# Patient Record
Sex: FEMALE | Race: WHITE | Employment: UNEMPLOYED | URBAN - METROPOLITAN AREA
[De-identification: names, ages, dates, MRNs, and addresses within clinical notes are randomized per-mention and may not be internally consistent; named-entity substitution may affect disease eponyms.]

---

## 2018-04-23 ENCOUNTER — HOSPITAL ENCOUNTER (OUTPATIENT)
Age: 23
Discharge: HOME | End: 2018-04-25
Payer: COMMERCIAL

## 2018-04-23 DIAGNOSIS — N92.1 MENORRHAGIA WITH IRREGULAR CYCLE: ICD-10-CM

## 2018-04-23 LAB
HCT VFR BLD CALC: 39.7 % (ref 34–48)
HEMOGLOBIN: 11.5 G/DL (ref 11.5–15.5)
MCH RBC QN AUTO: 23.6 PG (ref 26–35)
MCHC RBC AUTO-ENTMCNC: 29 % (ref 32–34.5)
MCV RBC AUTO: 81.5 FL (ref 80–99.9)
PDW BLD-RTO: ABNORMAL FL (ref 11.5–15)
PLATELET # BLD: 324 E9/L (ref 130–450)
PMV BLD AUTO: 10.7 FL (ref 7–12)
RBC # BLD: 4.87 E12/L (ref 3.5–5.5)
WBC # BLD: 6.1 E9/L (ref 4.5–11.5)

## 2018-04-23 PROCEDURE — 85027 COMPLETE CBC AUTOMATED: CPT

## 2018-05-08 ENCOUNTER — HOSPITAL ENCOUNTER (OUTPATIENT)
Age: 23
Discharge: HOME | End: 2018-05-10

## 2018-05-08 PROCEDURE — 88305 TISSUE EXAM BY PATHOLOGIST: CPT

## 2018-10-11 ENCOUNTER — OFFICE VISIT (OUTPATIENT)
Dept: ENDOCRINOLOGY | Age: 23
End: 2018-10-11
Payer: COMMERCIAL

## 2018-10-11 VITALS
SYSTOLIC BLOOD PRESSURE: 108 MMHG | HEIGHT: 63 IN | OXYGEN SATURATION: 98 % | DIASTOLIC BLOOD PRESSURE: 68 MMHG | BODY MASS INDEX: 24.66 KG/M2 | WEIGHT: 139.2 LBS | HEART RATE: 66 BPM

## 2018-10-11 DIAGNOSIS — E04.2 MULTINODULAR GOITER: Primary | ICD-10-CM

## 2018-10-11 DIAGNOSIS — E06.3 HASHIMOTO'S THYROIDITIS: ICD-10-CM

## 2018-10-11 PROCEDURE — 99204 OFFICE O/P NEW MOD 45 MIN: CPT | Performed by: INTERNAL MEDICINE

## 2018-10-11 NOTE — PROGRESS NOTES
1. 2   TSH 0.631   DHEAS (DHEA Sulfate) 304   Prolactin 16.44   17-OH PROGESTERONE 80.90       PAST MEDICAL HISTORY   Past Medical History:   Diagnosis Date    Depression     no medications    GERD (gastroesophageal reflux disease)     Hiatal hernia     PONV (postoperative nausea and vomiting)     Ulcer      PAST SURGICAL HISTORY   Past Surgical History:   Procedure Laterality Date     SECTION  2012    ENDOSCOPY, COLON, DIAGNOSTIC  2015    HYSTEROSCOPY  2018    D&C, polypectomy    INCISE AND DRAIN ABCESS  7/3/2012         UPPER GASTROINTESTINAL ENDOSCOPY  2015     SOCIAL HISTORY   Social History     Social History    Marital status:      Spouse name: N/A    Number of children: N/A    Years of education: N/A     Occupational History    Not on file. Social History Main Topics    Smoking status: Never Smoker    Smokeless tobacco: Never Used    Alcohol use No    Drug use: No    Sexual activity: Yes     Partners: Male     Birth control/ protection: Inserts      Comment: Nuva Ring     Other Topics Concern    Not on file     Social History Narrative    No narrative on file     FAMILY HISTORY   Family History   Problem Relation Age of Onset    Heart Attack Father 45        LAD    Diabetes Maternal Grandmother     Hypertension Maternal Grandfather      ALLERGIES AND DRUG REACTIONS   Allergies   Allergen Reactions    Iv Dye [Iodides] Other (See Comments)     Shut down kidneys, required hospitalization.     Medrol [Methylprednisolone]      IV caused tingling to arms legs face    Percocet [Oxycodone-Acetaminophen] Nausea Only and Other (See Comments)     Jaundice, fever, chills and hallucinations       CURRENT MEDICATIONS     Current Outpatient Prescriptions   Medication Sig Dispense Refill    etonogestrel-ethinyl estradiol (NUVARING) 0.12-0.015 MG/24HR vaginal ring Place 1 each vaginally every 21 days Insert one (1) ring vaginally and leave in place for three (3) weeks, then remove for one (1) week. 3 each 1    ferrous sulfate 325 (65 Fe) MG tablet Take 1 tablet by mouth 2 times daily (with meals) 60 tablet 3     No current facility-administered medications for this visit. Review of Systems  Constitutional: No fever, no chills, no diaphoresis, no generalized weakness. HEENT: No blurred vision, No sore throat, no ear pain, no hair loss  Neck: denied any neck swelling, difficulty swallowing,   Cadrdiopulomary: No CP, SOB or palpitation, No orthopnea or PND. No cough or wheezing. GI: No N/V/D, no constipation, No abdominal pain, no melena or hematochezia   : Denied any dysuria, hematuria, flank pain, discharge, or incontinence. Skin: denied any rash, ulcer, Hirsute, or hyperpigmentation. MSK: denied any joint deformity, joint pain/swelling, muscle pain, or back pain. Neuro: no numbess, no tingling, no weakness,     OBJECTIVE    /68 (Site: Left Upper Arm, Position: Sitting, Cuff Size: Medium Adult)   Pulse 66   Ht 5' 3\" (1.6 m)   Wt 139 lb 3.2 oz (63.1 kg)   SpO2 98%   BMI 24.66 kg/m²   BP Readings from Last 4 Encounters:   10/11/18 108/68   09/10/18 118/80   05/21/18 100/68   05/04/18 112/64     Wt Readings from Last 6 Encounters:   10/11/18 139 lb 3.2 oz (63.1 kg)   09/10/18 140 lb 6.4 oz (63.7 kg)   05/21/18 137 lb 3.2 oz (62.2 kg)   05/04/18 140 lb 3.2 oz (63.6 kg)   03/19/18 140 lb (63.5 kg)   03/07/18 140 lb (63.5 kg)       Physical examination:  General: awake alert, oriented x3, no abnormal position or movements. HEENT: normocephalic non traumatic  Neck: supple, no LN enlargement, no thyromegaly, no thyroid tenderness, no JVD. Pulm: Clear equal air entry no added sounds, no wheezing or rhonchi    CVS: S1 + S2, no murmur, no heave. Dorsalis pedis pulse palpable   Abd: soft lax, no tenderness, no organomegaly, audible bowel sounds. Skin: warm, no lesions, no rash.  No Palmar erythema    Neuro: CN intact, sensation normal , muscle power normal. No tremors   Psych: normal mood, and affect    Review of Laboratory Data:  I have reviewed the following:  Lab Results   Component Value Date/Time    WBC 6.1 04/23/2018 12:00 PM    RBC 4.87 04/23/2018 12:00 PM    HGB 11.5 04/23/2018 12:00 PM    HCT 39.7 04/23/2018 12:00 PM    MCV 81.5 04/23/2018 12:00 PM    MCH 23.6 (L) 04/23/2018 12:00 PM    MCHC 29.0 (L) 04/23/2018 12:00 PM    RDW NOT CALC 04/23/2018 12:00 PM     04/23/2018 12:00 PM    MPV 10.7 04/23/2018 12:00 PM      Lab Results   Component Value Date/Time     03/07/2018 03:46 PM    K 3.8 03/07/2018 03:46 PM    CO2 25 03/07/2018 03:46 PM    BUN 8 03/07/2018 03:46 PM    CALCIUM 9.6 03/07/2018 03:46 PM      Lab Results   Component Value Date/Time    TSH 0.631 03/02/2018 02:10 PM    T4FREE 1.29 03/02/2018 02:10 PM    FT3 3.4 03/02/2018 02:10 PM     No results found for: PTH  No results found for: VITD25    All labs medical records and images were reviewed independently   No procedure found. No procedure found. No procedure found. ASSESSMENT & RECOMMENDATIONS   Theresa Perez, a 20 y.o.-old female seen in for the following issues     Multinodular goiter   · Thyroid gland was mildly enlarged (Rt lobe measures 4.3 x 1.8 x 1.9 cm, Lt lobe measures 3.9 x 2 x 1.6 cm) and thyroid nodules are very tiny  · Prevalence of thyroid nodule on thyroid ultrasound is 50% and 95 % of these nodules are benign. · Given nodule size and lack of ultrasound suspicious features which making the nodule less likely to be malignant, I will continue following with periodic US  · I will repeat thyroid US in 5/2018   · Will also check Thyroid Abs in few days to r/o Hashimoto's thyroiditis especially with heterogenous appearance of thyroid gland on US     Follow up  · 5/2018     The above issues were reviewed with the patient who understood and agreed with the plan. 30 minutes were spent today in management of this patient.  More than 50% of time spent on counseling of patient on above diagnosis. Thank you for allowing us to participate in the care of this patient. Please do not hesitate to contact us with any additional questions. Diagnosis Orders   1. Multinodular goiter  TSH without Reflex    T4, Free    US Thyroid   2.  Hashimoto's thyroiditis  Thyroid AB      Enzo Osorio MD  Endocrinologist, [facility])   [facility] [address] [facility] [address]   Phone: [phone]  Fax: [phone]

## 2018-10-11 NOTE — LETTER
No current facility-administered medications for this visit. Review of Systems  Constitutional: No fever, no chills, no diaphoresis, no generalized weakness. HEENT: No blurred vision, No sore throat, no ear pain, no hair loss  Neck: denied any neck swelling, difficulty swallowing,   Cadrdiopulomary: No CP, SOB or palpitation, No orthopnea or PND. No cough or wheezing. GI: No N/V/D, no constipation, No abdominal pain, no melena or hematochezia   : Denied any dysuria, hematuria, flank pain, discharge, or incontinence. Skin: denied any rash, ulcer, Hirsute, or hyperpigmentation. MSK: denied any joint deformity, joint pain/swelling, muscle pain, or back pain. Neuro: no numbess, no tingling, no weakness,     OBJECTIVE    /68 (Site: Left Upper Arm, Position: Sitting, Cuff Size: Medium Adult)   Pulse 66   Ht 5' 3\" (1.6 m)   Wt 139 lb 3.2 oz (63.1 kg)   SpO2 98%   BMI 24.66 kg/m²    BP Readings from Last 4 Encounters:   10/11/18 108/68   09/10/18 118/80   05/21/18 100/68   05/04/18 112/64     Wt Readings from Last 6 Encounters:   10/11/18 139 lb 3.2 oz (63.1 kg)   09/10/18 140 lb 6.4 oz (63.7 kg)   05/21/18 137 lb 3.2 oz (62.2 kg)   05/04/18 140 lb 3.2 oz (63.6 kg)   03/19/18 140 lb (63.5 kg)   03/07/18 140 lb (63.5 kg)       Physical examination:  General: awake alert, oriented x3, no abnormal position or movements. HEENT: normocephalic non traumatic  Neck: supple, no LN enlargement, no thyromegaly, no thyroid tenderness, no JVD. Pulm: Clear equal air entry no added sounds, no wheezing or rhonchi    CVS: S1 + S2, no murmur, no heave. Dorsalis pedis pulse palpable   Abd: soft lax, no tenderness, no organomegaly, audible bowel sounds. Skin: warm, no lesions, no rash.  No Palmar erythema    Neuro: CN intact, sensation normal , muscle power normal. No tremors   Psych: normal mood, and affect    Review of Laboratory Data:  I have reviewed the following:  Lab Results   Component Value Date/Time WBC 6.1 04/23/2018 12:00 PM    RBC 4.87 04/23/2018 12:00 PM    HGB 11.5 04/23/2018 12:00 PM    HCT 39.7 04/23/2018 12:00 PM    MCV 81.5 04/23/2018 12:00 PM    MCH 23.6 (L) 04/23/2018 12:00 PM    MCHC 29.0 (L) 04/23/2018 12:00 PM    RDW NOT CALC 04/23/2018 12:00 PM     04/23/2018 12:00 PM    MPV 10.7 04/23/2018 12:00 PM      Lab Results   Component Value Date/Time     03/07/2018 03:46 PM    K 3.8 03/07/2018 03:46 PM    CO2 25 03/07/2018 03:46 PM    BUN 8 03/07/2018 03:46 PM    CALCIUM 9.6 03/07/2018 03:46 PM      Lab Results   Component Value Date/Time    TSH 0.631 03/02/2018 02:10 PM    T4FREE 1.29 03/02/2018 02:10 PM    FT3 3.4 03/02/2018 02:10 PM     No results found for: PTH  No results found for: VITD25    All labs medical records and images were reviewed independently   No procedure found. No procedure found. No procedure found. ASSESSMENT & RECOMMENDATIONS   Theresa Perez, a 20 y.o.-old female seen in for the following issues     Multinodular goiter   · Prevalence of thyroid nodule on thyroid ultrasound is 50% and 95 % of these nodules are benign. · Given nodule size and lack of ultrasound suspicious features which making the nodule less likely to be malignant, I will continue following with periodic US      Follow up    The above issues were reviewed with the patient who understood and agreed with the plan. 30 minutes were spent today in management of this patient. More than 50% of time spent on counseling of patient on above diagnosis. Thank you for allowing us to participate in the care of this patient. Please do not hesitate to contact us with any additional questions.      Lefty Villar MD  Endocrinologist, [facility])   [facility] [address] [facility] [address]   Phone: [phone]  Fax: [phone]

## 2019-01-02 ENCOUNTER — TELEPHONE (OUTPATIENT)
Dept: FAMILY MEDICINE CLINIC | Age: 24
End: 2019-01-02

## 2019-01-04 ENCOUNTER — OFFICE VISIT (OUTPATIENT)
Dept: FAMILY MEDICINE CLINIC | Age: 24
End: 2019-01-04
Payer: COMMERCIAL

## 2019-01-04 VITALS
SYSTOLIC BLOOD PRESSURE: 118 MMHG | HEART RATE: 98 BPM | BODY MASS INDEX: 23.03 KG/M2 | DIASTOLIC BLOOD PRESSURE: 68 MMHG | TEMPERATURE: 98 F | WEIGHT: 130 LBS | OXYGEN SATURATION: 99 %

## 2019-01-04 DIAGNOSIS — F43.23 ADJUSTMENT DISORDER WITH MIXED ANXIETY AND DEPRESSED MOOD: Primary | ICD-10-CM

## 2019-01-04 DIAGNOSIS — Z13.31 POSITIVE DEPRESSION SCREENING: ICD-10-CM

## 2019-01-04 PROCEDURE — 96160 PT-FOCUSED HLTH RISK ASSMT: CPT | Performed by: FAMILY MEDICINE

## 2019-01-04 PROCEDURE — G8484 FLU IMMUNIZE NO ADMIN: HCPCS | Performed by: FAMILY MEDICINE

## 2019-01-04 PROCEDURE — G8420 CALC BMI NORM PARAMETERS: HCPCS | Performed by: FAMILY MEDICINE

## 2019-01-04 PROCEDURE — G8431 POS CLIN DEPRES SCRN F/U DOC: HCPCS | Performed by: FAMILY MEDICINE

## 2019-01-04 PROCEDURE — G8427 DOCREV CUR MEDS BY ELIG CLIN: HCPCS | Performed by: FAMILY MEDICINE

## 2019-01-04 PROCEDURE — 99214 OFFICE O/P EST MOD 30 MIN: CPT | Performed by: FAMILY MEDICINE

## 2019-01-04 PROCEDURE — 1036F TOBACCO NON-USER: CPT | Performed by: FAMILY MEDICINE

## 2019-01-04 RX ORDER — CITALOPRAM 20 MG/1
20 TABLET ORAL DAILY
Qty: 30 TABLET | Refills: 1 | Status: SHIPPED | OUTPATIENT
Start: 2019-01-04 | End: 2019-02-04 | Stop reason: SDUPTHER

## 2019-01-04 ASSESSMENT — PATIENT HEALTH QUESTIONNAIRE - PHQ9
9. THOUGHTS THAT YOU WOULD BE BETTER OFF DEAD, OR OF HURTING YOURSELF: 0
3. TROUBLE FALLING OR STAYING ASLEEP: 3
6. FEELING BAD ABOUT YOURSELF - OR THAT YOU ARE A FAILURE OR HAVE LET YOURSELF OR YOUR FAMILY DOWN: 3
5. POOR APPETITE OR OVEREATING: 3
2. FEELING DOWN, DEPRESSED OR HOPELESS: 3
4. FEELING TIRED OR HAVING LITTLE ENERGY: 3
SUM OF ALL RESPONSES TO PHQ QUESTIONS 1-9: 21
SUM OF ALL RESPONSES TO PHQ9 QUESTIONS 1 & 2: 6
8. MOVING OR SPEAKING SO SLOWLY THAT OTHER PEOPLE COULD HAVE NOTICED. OR THE OPPOSITE, BEING SO FIGETY OR RESTLESS THAT YOU HAVE BEEN MOVING AROUND A LOT MORE THAN USUAL: 0
7. TROUBLE CONCENTRATING ON THINGS, SUCH AS READING THE NEWSPAPER OR WATCHING TELEVISION: 3
1. LITTLE INTEREST OR PLEASURE IN DOING THINGS: 3
SUM OF ALL RESPONSES TO PHQ QUESTIONS 1-9: 21
10. IF YOU CHECKED OFF ANY PROBLEMS, HOW DIFFICULT HAVE THESE PROBLEMS MADE IT FOR YOU TO DO YOUR WORK, TAKE CARE OF THINGS AT HOME, OR GET ALONG WITH OTHER PEOPLE: 3

## 2019-02-04 ENCOUNTER — OFFICE VISIT (OUTPATIENT)
Dept: FAMILY MEDICINE CLINIC | Age: 24
End: 2019-02-04
Payer: COMMERCIAL

## 2019-02-04 VITALS
DIASTOLIC BLOOD PRESSURE: 70 MMHG | TEMPERATURE: 98.9 F | BODY MASS INDEX: 23.03 KG/M2 | WEIGHT: 130 LBS | OXYGEN SATURATION: 98 % | HEART RATE: 98 BPM | SYSTOLIC BLOOD PRESSURE: 120 MMHG

## 2019-02-04 DIAGNOSIS — F43.23 ADJUSTMENT DISORDER WITH MIXED ANXIETY AND DEPRESSED MOOD: Primary | ICD-10-CM

## 2019-02-04 DIAGNOSIS — E04.2 MULTINODULAR THYROID: ICD-10-CM

## 2019-02-04 PROCEDURE — G8427 DOCREV CUR MEDS BY ELIG CLIN: HCPCS | Performed by: FAMILY MEDICINE

## 2019-02-04 PROCEDURE — 1036F TOBACCO NON-USER: CPT | Performed by: FAMILY MEDICINE

## 2019-02-04 PROCEDURE — G8484 FLU IMMUNIZE NO ADMIN: HCPCS | Performed by: FAMILY MEDICINE

## 2019-02-04 PROCEDURE — G8420 CALC BMI NORM PARAMETERS: HCPCS | Performed by: FAMILY MEDICINE

## 2019-02-04 PROCEDURE — 99213 OFFICE O/P EST LOW 20 MIN: CPT | Performed by: FAMILY MEDICINE

## 2019-02-04 RX ORDER — CITALOPRAM 20 MG/1
20 TABLET ORAL DAILY
Qty: 30 TABLET | Refills: 5 | Status: SHIPPED | OUTPATIENT
Start: 2019-02-04 | End: 2019-09-13 | Stop reason: SDUPTHER

## 2019-06-13 ENCOUNTER — OFFICE VISIT (OUTPATIENT)
Dept: FAMILY MEDICINE CLINIC | Age: 24
End: 2019-06-13
Payer: COMMERCIAL

## 2019-06-13 ENCOUNTER — HOSPITAL ENCOUNTER (OUTPATIENT)
Age: 24
Discharge: HOME | End: 2019-06-15
Payer: COMMERCIAL

## 2019-06-13 VITALS
OXYGEN SATURATION: 99 % | HEART RATE: 111 BPM | HEIGHT: 62 IN | SYSTOLIC BLOOD PRESSURE: 110 MMHG | TEMPERATURE: 98.7 F | WEIGHT: 125 LBS | DIASTOLIC BLOOD PRESSURE: 68 MMHG | BODY MASS INDEX: 23 KG/M2

## 2019-06-13 DIAGNOSIS — F41.9 ANXIETY: ICD-10-CM

## 2019-06-13 DIAGNOSIS — D50.9 IRON DEFICIENCY ANEMIA, UNSPECIFIED IRON DEFICIENCY ANEMIA TYPE: ICD-10-CM

## 2019-06-13 DIAGNOSIS — L70.0 ACNE VULGARIS: ICD-10-CM

## 2019-06-13 DIAGNOSIS — F41.9 ANXIETY: Primary | ICD-10-CM

## 2019-06-13 LAB
ALBUMIN SERPL-MCNC: 4.6 G/DL (ref 3.5–5.2)
ALP BLD-CCNC: 46 U/L (ref 35–104)
ALT SERPL-CCNC: 12 U/L (ref 0–32)
ANION GAP SERPL CALCULATED.3IONS-SCNC: 16 MMOL/L (ref 7–16)
AST SERPL-CCNC: 19 U/L (ref 0–31)
BASOPHILS ABSOLUTE: 0.02 E9/L (ref 0–0.2)
BASOPHILS RELATIVE PERCENT: 0.4 % (ref 0–2)
BILIRUB SERPL-MCNC: 0.8 MG/DL (ref 0–1.2)
BUN BLDV-MCNC: 8 MG/DL (ref 6–20)
CALCIUM SERPL-MCNC: 9.8 MG/DL (ref 8.6–10.2)
CHLORIDE BLD-SCNC: 104 MMOL/L (ref 98–107)
CO2: 22 MMOL/L (ref 22–29)
CREAT SERPL-MCNC: 0.7 MG/DL (ref 0.5–1)
EOSINOPHILS ABSOLUTE: 0.26 E9/L (ref 0.05–0.5)
EOSINOPHILS RELATIVE PERCENT: 4.6 % (ref 0–6)
FERRITIN: 15 NG/ML
GFR AFRICAN AMERICAN: >60
GFR NON-AFRICAN AMERICAN: >60 ML/MIN/1.73
GLUCOSE BLD-MCNC: 82 MG/DL (ref 74–99)
HCT VFR BLD CALC: 40.9 % (ref 34–48)
HEMOGLOBIN: 13.1 G/DL (ref 11.5–15.5)
IMMATURE GRANULOCYTES #: 0.03 E9/L
IMMATURE GRANULOCYTES %: 0.5 % (ref 0–5)
IRON SATURATION: 12 % (ref 15–50)
IRON: 56 MCG/DL (ref 37–145)
LYMPHOCYTES ABSOLUTE: 0.74 E9/L (ref 1.5–4)
LYMPHOCYTES RELATIVE PERCENT: 13.1 % (ref 20–42)
MCH RBC QN AUTO: 30 PG (ref 26–35)
MCHC RBC AUTO-ENTMCNC: 32 % (ref 32–34.5)
MCV RBC AUTO: 93.6 FL (ref 80–99.9)
MONOCYTES ABSOLUTE: 0.49 E9/L (ref 0.1–0.95)
MONOCYTES RELATIVE PERCENT: 8.7 % (ref 2–12)
NEUTROPHILS ABSOLUTE: 4.09 E9/L (ref 1.8–7.3)
NEUTROPHILS RELATIVE PERCENT: 72.7 % (ref 43–80)
PDW BLD-RTO: 14.9 FL (ref 11.5–15)
PLATELET # BLD: 255 E9/L (ref 130–450)
PMV BLD AUTO: 11.3 FL (ref 7–12)
POTASSIUM SERPL-SCNC: 4.4 MMOL/L (ref 3.5–5)
RBC # BLD: 4.37 E12/L (ref 3.5–5.5)
SODIUM BLD-SCNC: 142 MMOL/L (ref 132–146)
TOTAL IRON BINDING CAPACITY: 456 MCG/DL (ref 250–450)
TOTAL PROTEIN: 7.8 G/DL (ref 6.4–8.3)
TSH SERPL DL<=0.05 MIU/L-ACNC: 0.8 UIU/ML (ref 0.27–4.2)
WBC # BLD: 5.6 E9/L (ref 4.5–11.5)

## 2019-06-13 PROCEDURE — G8427 DOCREV CUR MEDS BY ELIG CLIN: HCPCS | Performed by: FAMILY MEDICINE

## 2019-06-13 PROCEDURE — 83540 ASSAY OF IRON: CPT

## 2019-06-13 PROCEDURE — 1036F TOBACCO NON-USER: CPT | Performed by: FAMILY MEDICINE

## 2019-06-13 PROCEDURE — 85025 COMPLETE CBC W/AUTO DIFF WBC: CPT

## 2019-06-13 PROCEDURE — 83550 IRON BINDING TEST: CPT

## 2019-06-13 PROCEDURE — 99214 OFFICE O/P EST MOD 30 MIN: CPT | Performed by: FAMILY MEDICINE

## 2019-06-13 PROCEDURE — 80053 COMPREHEN METABOLIC PANEL: CPT

## 2019-06-13 PROCEDURE — 82728 ASSAY OF FERRITIN: CPT

## 2019-06-13 PROCEDURE — 36415 COLL VENOUS BLD VENIPUNCTURE: CPT | Performed by: FAMILY MEDICINE

## 2019-06-13 PROCEDURE — G8420 CALC BMI NORM PARAMETERS: HCPCS | Performed by: FAMILY MEDICINE

## 2019-06-13 PROCEDURE — 84443 ASSAY THYROID STIM HORMONE: CPT

## 2019-06-13 RX ORDER — HYDROXYZINE PAMOATE 25 MG/1
25 CAPSULE ORAL NIGHTLY PRN
Qty: 30 CAPSULE | Refills: 1 | Status: SHIPPED | OUTPATIENT
Start: 2019-06-13 | End: 2019-08-15 | Stop reason: SDUPTHER

## 2019-06-13 RX ORDER — CLINDAMYCIN PHOSPHATE 10 MG/ML
SOLUTION TOPICAL
Qty: 60 EACH | Refills: 2 | Status: SHIPPED | OUTPATIENT
Start: 2019-06-13 | End: 2019-09-08 | Stop reason: SDUPTHER

## 2019-06-13 NOTE — PROGRESS NOTES
[facility]  Office Progress Note - Dr. Lazara Anderson  19    CC:   Chief Complaint   Patient presents with    Depression        S: depression  Improved but having increased anxiety and panic attacks. Sweaty hands, feeling like going to throw up, cant breathe, cant talk, shaking. Often times random, will sometimes wake with them. Sometimes going in public will bring them on   [address] in [facility] -therapist. Niles Scott since 2018. Panic attacks have been going on for a few months. Dec caffeine consumption. Some trouble sleeping  -wakes through tht enight every couple hours. Has tried yoga. freq of attacks is a few times a week or so. Appetite has bene decreased. She notes an acne history on her face and is requesting medication for that. She uses over-the-counter products without significant relief. Duration has been months to years. Recent flareup. She notes an iron deficiency anemia history. She is to be quite anemic. Could not tolerate iron tablets as they upset her stomach, even with every other day dosing. No melena. She does try to eat increased iron in her diet. She has been somewhat fatigued occasionally.     Past Medical History:   Diagnosis Date    Depression     no medications    GERD (gastroesophageal reflux disease)     Hiatal hernia     PONV (postoperative nausea and vomiting)     Ulcer        Family History   Problem Relation Age of Onset    Heart Attack Father 45        LAD    Diabetes Maternal Grandmother     Hypertension Maternal Grandfather        Past Surgical History:   Procedure Laterality Date     SECTION  2012    ENDOSCOPY, COLON, DIAGNOSTIC  2015    HYSTEROSCOPY  2018    D&C, polypectomy    INCISE AND DRAIN ABCESS  7/3/2012         UPPER GASTROINTESTINAL ENDOSCOPY  2015       Social History     Tobacco Use    Smoking status: Never Smoker    Smokeless tobacco: Never Used No SI or HI. Current Outpatient Medications on File Prior to Visit   Medication Sig Dispense Refill    citalopram (CELEXA) 20 MG tablet Take 1 tablet by mouth daily 30 tablet 5    etonogestrel-ethinyl estradiol (NUVARING) 0.12-0.015 MG/24HR vaginal ring Place 1 each vaginally every 21 days Insert one (1) ring vaginally and leave in place for three (3) weeks, then remove for one (1) week. 3 each 1    ferrous sulfate 325 (65 Fe) MG tablet Take 1 tablet by mouth 2 times daily (with meals) 60 tablet 3     No current facility-administered medications on file prior to visit. Patient Active Problem List   Diagnosis Code    GBS (group B Streptococcus carrier), +RV culture, currently pregnant O99.820    Maternal varicella, non-immune O09.899, Z28.3    Wound infection following  section, postpartum O86.00    Multinodular goiter E04.2        Assessment / Yulia Wong was seen today for depression. Diagnoses and all orders for this visit:    Anxiety, increased recently  -     CBC Auto Differential; Future  -     Comprehensive Metabolic Panel; Future  -     TSH without Reflex; Future  -Start    hydrOXYzine (VISTARIL) 25 MG capsule; Take 1 capsule by mouth nightly as needed for Itching  Depression significantly improved with citalopram.  Anxiety and panic have crept up on her. No specific reason identified. We will try some as needed hydroxyzine to see if it helps with sleep and panic. If she is not improving with that, I would like to try increasing her citalopram.    Acne vulgaris  -Start   cLINDAMYCIN PHOSPHATE,TOPICAL, 1 % SWAB; Swab face once to twice daily after gentle soap and rinse. Iron deficiency anemia, unspecified iron deficiency anemia type  -     Ferritin; Future  -     Iron and TIBC; Future  Check labs. Labs reviewed and show improvement from historical levels. She is no longer anemic. Her iron stores are on the low side. Her TIBC is increased.     Return in about 6 weeks (around 7/25/2019). Patient counseled to follow up sooner or seek more acute care if symptoms worsening. Electronically signed by Deb Montague MD on 6/16/2019    This note may have been created using dictation software.  Efforts were made to reduce grammatical or syntax errors, but some may persist.

## 2019-07-02 ENCOUNTER — OFFICE VISIT (OUTPATIENT)
Dept: FAMILY MEDICINE CLINIC | Age: 24
End: 2019-07-02
Payer: COMMERCIAL

## 2019-07-02 VITALS
DIASTOLIC BLOOD PRESSURE: 62 MMHG | HEIGHT: 62 IN | HEART RATE: 84 BPM | BODY MASS INDEX: 23.55 KG/M2 | SYSTOLIC BLOOD PRESSURE: 100 MMHG | TEMPERATURE: 98.2 F | WEIGHT: 128 LBS | OXYGEN SATURATION: 98 %

## 2019-07-02 DIAGNOSIS — N64.4 BREAST PAIN, RIGHT: Primary | ICD-10-CM

## 2019-07-02 PROCEDURE — G8420 CALC BMI NORM PARAMETERS: HCPCS | Performed by: FAMILY MEDICINE

## 2019-07-02 PROCEDURE — 1036F TOBACCO NON-USER: CPT | Performed by: FAMILY MEDICINE

## 2019-07-02 PROCEDURE — G8427 DOCREV CUR MEDS BY ELIG CLIN: HCPCS | Performed by: FAMILY MEDICINE

## 2019-07-02 PROCEDURE — 99213 OFFICE O/P EST LOW 20 MIN: CPT | Performed by: FAMILY MEDICINE

## 2019-07-02 NOTE — PROGRESS NOTES
[facility]  Office Progress Note - Dr. Jarrod Bradley  17    CC:   Chief Complaint   Patient presents with    Breast Mass     right breast lump/painful x6mths        S: Right breast or rib pain  About 6 months. Pressure on the area makes worse. No specific injury. Alleviating factors include none. No breast changes - skin, nipple, discharge etc.    Fam Hx of breast cancer in mother's aunt  Doesn't vary with cycle. Is present every day. Lifts things often. On chaperoned exam with medical assistant Asaf General  Right breast without visual abnormality. Some heterogeneous breast tissue without one specific discrete mass. Tenderness reproducible with palpation. No rib abnormalities underlying. Past Medical History:   Diagnosis Date    Depression     no medications    GERD (gastroesophageal reflux disease)     Hiatal hernia     PONV (postoperative nausea and vomiting)     Ulcer        Family History   Problem Relation Age of Onset    Heart Attack Father 45        LAD    Diabetes Maternal Grandmother     Hypertension Maternal Grandfather        Past Surgical History:   Procedure Laterality Date     SECTION  2012    ENDOSCOPY, COLON, DIAGNOSTIC  2015    HYSTEROSCOPY  2018    D&C, polypectomy    INCISE AND DRAIN ABCESS  7/3/2012         UPPER GASTROINTESTINAL ENDOSCOPY  2015       Social History     Tobacco Use    Smoking status: Never Smoker    Smokeless tobacco: Never Used   Substance Use Topics    Alcohol use: No     Alcohol/week: 0.0 oz    Drug use: No       ROS:  No CP, No palpitations,   No sob, No cough,   No abd pain, No heartburn,   No headaches,   No tingling, No numbness, No weakness,   No bowel changes, No hematochezia, No melena,  ROS otherwise negative unless as listed in HPI. Chart reviewed and updated where appropriate for PMH, Fam, and Soc Hx.     Physical Exam   /62 (Site: Right Upper Arm, Position: XR RIBS RIGHT INCLUDE CHEST (MIN 3 VIEWS); Future    6 months of right breast pain, noncyclical, persistent. Suspect rib pain, versus chest wall pain, versus true breast pain. Recommend x-ray ribs and ultrasound breast to further evaluate the area radiographically. Return if symptoms worsen or fail to improve. Patient counseled to follow up sooner or seek more acute care if symptoms worsening. Electronically signed by Abisai Hurtado MD on 7/3/2019    This note may have been created using dictation software.  Efforts were made to reduce grammatical or syntax errors, but some may persist.

## 2019-07-29 ENCOUNTER — OFFICE VISIT (OUTPATIENT)
Dept: FAMILY MEDICINE CLINIC | Age: 24
End: 2019-07-29
Payer: COMMERCIAL

## 2019-07-29 VITALS
SYSTOLIC BLOOD PRESSURE: 120 MMHG | OXYGEN SATURATION: 99 % | DIASTOLIC BLOOD PRESSURE: 70 MMHG | WEIGHT: 128 LBS | HEART RATE: 70 BPM | TEMPERATURE: 98.3 F | BODY MASS INDEX: 23.41 KG/M2

## 2019-07-29 DIAGNOSIS — F41.9 ANXIETY: ICD-10-CM

## 2019-07-29 DIAGNOSIS — H66.90 ACUTE OTITIS MEDIA, UNSPECIFIED OTITIS MEDIA TYPE: ICD-10-CM

## 2019-07-29 DIAGNOSIS — L40.9 PSORIASIS: Primary | ICD-10-CM

## 2019-07-29 DIAGNOSIS — R07.89 CHEST WALL PAIN: ICD-10-CM

## 2019-07-29 PROCEDURE — 1036F TOBACCO NON-USER: CPT | Performed by: FAMILY MEDICINE

## 2019-07-29 PROCEDURE — G8427 DOCREV CUR MEDS BY ELIG CLIN: HCPCS | Performed by: FAMILY MEDICINE

## 2019-07-29 PROCEDURE — 99214 OFFICE O/P EST MOD 30 MIN: CPT | Performed by: FAMILY MEDICINE

## 2019-07-29 PROCEDURE — G8420 CALC BMI NORM PARAMETERS: HCPCS | Performed by: FAMILY MEDICINE

## 2019-07-29 RX ORDER — AMOXICILLIN 500 MG/1
500 TABLET, FILM COATED ORAL 2 TIMES DAILY
Qty: 20 TABLET | Refills: 0 | Status: SHIPPED | OUTPATIENT
Start: 2019-07-29 | End: 2019-08-26

## 2019-07-29 RX ORDER — TRIAMCINOLONE ACETONIDE 5 MG/G
CREAM TOPICAL
Qty: 30 G | Refills: 2 | Status: SHIPPED
Start: 2019-07-29 | End: 2020-10-08 | Stop reason: SDUPTHER

## 2019-07-29 NOTE — PROGRESS NOTES
[facility]  Office Progress Note - Dr. Mac Carrillo  17    CC:   Chief Complaint   Patient presents with    Rib Injury     still having right sided chest/ rib pain -6 wk f/u        S: anx  Improve with vistaril use  Has only used at bedtime. Pretty much nightly. Does make her sleepy. When not taking it at night she would feel more panicked the next day. Right ear  Difficulty hearing  Touching is sore in the area  Started about 4 days ago. Worsening. No fevers. No swimming lately. Did try some OTC ear drops. Cerumen impaction noted and cleared today. Relief after procedure. Noted rash on forehead right along the hairline and then on further exam involvement of the scalp as well. When asked further, she also has gluteal cleft involvement. This does appear to be psoriasis. She says it has been present for years, probably since childhood but never a definitive diagnosis. Sometimes itchy. Shampoos have not worked in hair. Follow-up of chest wall pain  Patient reports that the pain has moved more caudally, now does not really involve the breast any longer. She is able to reproduce it by pushing over her ribs. She says she has been working more lately and they have been painting a basement with very high ceilings. This is caused her to do more stretching, bending, twisting. She uses a paint roller all day. She also lifts objects.   She has a few days off and is going to see if symptoms improve    Past Medical History:   Diagnosis Date    Depression     no medications    GERD (gastroesophageal reflux disease)     Hiatal hernia     PONV (postoperative nausea and vomiting)     Ulcer        Family History   Problem Relation Age of Onset    Heart Attack Father 45        LAD    Diabetes Maternal Grandmother     Hypertension Maternal Grandfather        Past Surgical History:   Procedure Laterality Date     SECTION  2012    ENDOSCOPY, COLON, DIAGNOSTIC  12/14/2015    HYSTEROSCOPY  05/08/2018    D&C, polypectomy    INCISE AND DRAIN ABCESS  7/3/2012         UPPER GASTROINTESTINAL ENDOSCOPY  9/28/2015       Social History     Tobacco Use    Smoking status: Never Smoker    Smokeless tobacco: Never Used   Substance Use Topics    Alcohol use: No     Alcohol/week: 0.0 standard drinks    Drug use: No       ROS:  No CP, No palpitations,   No sob, No cough,   No abd pain, No heartburn,   No headaches,   No tingling, No numbness, No weakness,   No bowel changes, No hematochezia, No melena,  No bladder changes, No hematuria  +skin rashes, No skin lesions. No vision changes, +hearing changes,   No polyuria, polydipsia, polyphagia. Stable mood. ROS otherwise negative unless as listed in HPI. Chart reviewed and updated where appropriate for PMH, Fam, and Soc Hx. Physical Exam   /70 (Site: Right Upper Arm, Position: Sitting, Cuff Size: Medium Adult)   Pulse 70   Temp 98.3 °F (36.8 °C) (Oral)   Wt 128 lb (58.1 kg)   SpO2 99%   BMI 23.41 kg/m²   Wt Readings from Last 3 Encounters:   07/29/19 128 lb (58.1 kg)   07/02/19 128 lb (58.1 kg)   06/13/19 125 lb (56.7 kg)       Constitutional:    She is oriented to person, place, and time. She appears well-developed and well-nourished. HENT:    Right Ear: Cerumen impaction initially, cleared and then tympanic membrane erythematous and dull, external ear and ear canal normal.    Left Ear: Tympanic membrane, external ear and ear canal normal.    Nose: Nose normal.    Mouth/Throat: Oropharynx is clear and moist.   Eyes:    Conjunctivae are normal.    Pupils are equal, round, and reactive to light. EOMI. Neck:    Normal range of motion. No thyromegaly or nodules noted. No bruit. No adenopathy  Cardiovascular:    Normal rate, regular rhythm and normal heart sounds. No murmur. No gallop and no friction rub. Pulmonary/Chest:    Effort normal and breath sounds normal.    No wheezes.  No rales or rhonchi. Abdominal:    Soft. Bowel sounds are normal.    No distension. No tenderness. Musculoskeletal:    Normal range of motion. No joint swelling noted. No peripheral edema. Tenderness to palpation over the lower right rib border. Skin:    Skin is warm and dry. She has a psoriatic rash in her hair, along the forehead hairline, and patient reportedly in the gluteal cleft. Psychiatric:    She has a normal mood and affect. Improved anxiety   Normal groom and dress. Current Outpatient Medications on File Prior to Visit   Medication Sig Dispense Refill    CLINDAMYCIN PHOSPHATE,TOPICAL, 1 % SWAB Swab face once to twice daily after gentle soap and rinse. 60 each 2    citalopram (CELEXA) 20 MG tablet Take 1 tablet by mouth daily 30 tablet 5    etonogestrel-ethinyl estradiol (NUVARING) 0.12-0.015 MG/24HR vaginal ring Place 1 each vaginally every 21 days Insert one (1) ring vaginally and leave in place for three (3) weeks, then remove for one (1) week. 3 each 1    ferrous sulfate 325 (65 Fe) MG tablet Take 1 tablet by mouth 2 times daily (with meals) 60 tablet 3     No current facility-administered medications on file prior to visit. Patient Active Problem List   Diagnosis Code    GBS (group B Streptococcus carrier), +RV culture, currently pregnant O99.820    Maternal varicella, non-immune O09.899, Z28.3    Wound infection following  section, postpartum O86.00    Multinodular goiter E04.2        Assessment / Orval Motts was seen today for rib injury. Diagnoses and all orders for this visit:    Psoriasis, new diagnosis  -Start   triamcinolone (ARISTOCORT) 0.5 % cream; Apply topically to affected areas 2 times daily. We will try some topical cream along the hairline. She is counseled about persistent use with skin thinning and hypopigmentation.   Counseled to take breaks from the medication    Acute otitis media, unspecified otitis media type  -Start    amoxicillin 500 MG TABS; Take 500 mg by mouth 2 times daily  She does have some infection signs based on her exam and that the cerumen impaction may have irritated the local tissues. Anxiety  Improved with the Vistaril use. Continue same. Chest wall pain  Persistent and now more localizing to the chest wall rather than breast.  Suspect that this is occupational related. She has about 3 or 4 days off and she is going to see if symptoms improve    Return if symptoms worsen or fail to improve. Patient counseled to follow up sooner or seek more acute care if symptoms worsening. Electronically signed by Raudel Muniz MD on 8/3/2019    This note may have been created using dictation software.  Efforts were made to reduce grammatical or syntax errors, but some may persist.

## 2019-08-10 DIAGNOSIS — F41.9 ANXIETY: ICD-10-CM

## 2019-08-12 RX ORDER — HYDROXYZINE PAMOATE 25 MG/1
25 CAPSULE ORAL NIGHTLY PRN
Qty: 30 CAPSULE | Refills: 1 | OUTPATIENT
Start: 2019-08-12 | End: 2019-08-26

## 2019-08-15 RX ORDER — HYDROXYZINE PAMOATE 25 MG/1
25 CAPSULE ORAL NIGHTLY PRN
Qty: 30 CAPSULE | Refills: 1 | Status: SHIPPED | OUTPATIENT
Start: 2019-08-15 | End: 2019-09-16

## 2019-08-26 ENCOUNTER — HOSPITAL ENCOUNTER (OUTPATIENT)
Age: 24
Discharge: HOME | End: 2019-08-28
Payer: COMMERCIAL

## 2019-08-26 PROCEDURE — 88175 CYTOPATH C/V AUTO FLUID REDO: CPT

## 2019-08-27 ENCOUNTER — HOSPITAL ENCOUNTER (OUTPATIENT)
Age: 24
Discharge: HOME | End: 2019-08-29
Payer: COMMERCIAL

## 2019-08-27 DIAGNOSIS — R10.2 PELVIC PAIN: ICD-10-CM

## 2019-08-27 LAB
BACTERIA: ABNORMAL /HPF
BILIRUBIN URINE: NEGATIVE
BLOOD, URINE: NEGATIVE
CLARITY: CLEAR
COLOR: YELLOW
EPITHELIAL CELLS, UA: ABNORMAL /HPF
GLUCOSE URINE: NEGATIVE MG/DL
KETONES, URINE: NEGATIVE MG/DL
LEUKOCYTE ESTERASE, URINE: ABNORMAL
NITRITE, URINE: NEGATIVE
PH UA: 6.5 (ref 5–9)
PROTEIN UA: NEGATIVE MG/DL
RBC UA: ABNORMAL /HPF (ref 0–2)
SPECIFIC GRAVITY UA: 1.02 (ref 1–1.03)
UROBILINOGEN, URINE: 0.2 E.U./DL
WBC UA: ABNORMAL /HPF (ref 0–5)

## 2019-08-27 PROCEDURE — 81001 URINALYSIS AUTO W/SCOPE: CPT

## 2019-08-27 PROCEDURE — 87088 URINE BACTERIA CULTURE: CPT

## 2019-08-29 LAB — URINE CULTURE, ROUTINE: NORMAL

## 2019-09-06 ENCOUNTER — OFFICE VISIT (OUTPATIENT)
Dept: FAMILY MEDICINE CLINIC | Age: 24
End: 2019-09-06
Payer: COMMERCIAL

## 2019-09-06 VITALS
DIASTOLIC BLOOD PRESSURE: 72 MMHG | HEART RATE: 84 BPM | TEMPERATURE: 98.6 F | OXYGEN SATURATION: 97 % | WEIGHT: 124 LBS | BODY MASS INDEX: 21.97 KG/M2 | SYSTOLIC BLOOD PRESSURE: 120 MMHG

## 2019-09-06 DIAGNOSIS — F32.A ANXIETY AND DEPRESSION: Primary | ICD-10-CM

## 2019-09-06 DIAGNOSIS — F41.9 ANXIETY AND DEPRESSION: Primary | ICD-10-CM

## 2019-09-06 PROCEDURE — G8420 CALC BMI NORM PARAMETERS: HCPCS | Performed by: FAMILY MEDICINE

## 2019-09-06 PROCEDURE — G8427 DOCREV CUR MEDS BY ELIG CLIN: HCPCS | Performed by: FAMILY MEDICINE

## 2019-09-06 PROCEDURE — 99213 OFFICE O/P EST LOW 20 MIN: CPT | Performed by: FAMILY MEDICINE

## 2019-09-06 PROCEDURE — 1036F TOBACCO NON-USER: CPT | Performed by: FAMILY MEDICINE

## 2019-09-06 NOTE — PROGRESS NOTES
GASTROINTESTINAL ENDOSCOPY  9/28/2015       Social History     Tobacco Use    Smoking status: Never Smoker    Smokeless tobacco: Never Used   Substance Use Topics    Alcohol use: No     Alcohol/week: 0.0 standard drinks    Drug use: No       ROS:  No CP, No palpitations,   No sob, No cough,   No abd pain, No heartburn,   No headaches,   No tingling, No numbness, No weakness,   Sad, anxious,depressed Mood. No SI or HI.  ROS otherwise negative unless as listed in HPI. Chart reviewed and updated where appropriate for PMH, Fam, and Soc Hx. Physical Exam   /72 (Site: Right Upper Arm, Position: Sitting, Cuff Size: Medium Adult)   Pulse 84   Temp 98.6 °F (37 °C) (Oral)   Wt 124 lb (56.2 kg)   LMP 08/01/2019 (Approximate) Comment: Nuva Ring  SpO2 97%   BMI 21.97 kg/m²   Wt Readings from Last 3 Encounters:   09/06/19 124 lb (56.2 kg)   08/26/19 128 lb (58.1 kg)   07/29/19 128 lb (58.1 kg)       Constitutional:    She is oriented to person, place, and time. She appears well-developed and well-nourished. Naoma Broccoli Psychiatric:    She has a, anxious, depressed mood and appropriately tearful affect. No SI or HI. No plans to harm self or others. No thoughts about harming self or others. Normal groom and dress. Current Outpatient Medications on File Prior to Visit   Medication Sig Dispense Refill    drospirenone-ethinyl estradiol (DEBO) 3-0.02 MG per tablet Take 1 tablet by mouth daily 28 tablet 3    hydrOXYzine (VISTARIL) 25 MG capsule Take 1 capsule by mouth nightly as needed for Anxiety 30 capsule 1    CLINDAMYCIN PHOSPHATE,TOPICAL, 1 % SWAB Swab face once to twice daily after gentle soap and rinse. 60 each 2    citalopram (CELEXA) 20 MG tablet Take 1 tablet by mouth daily 30 tablet 5    ferrous sulfate 325 (65 Fe) MG tablet Take 1 tablet by mouth 2 times daily (with meals) 60 tablet 3     No current facility-administered medications on file prior to visit.         Patient Active Problem List

## 2019-09-08 DIAGNOSIS — L70.0 ACNE VULGARIS: ICD-10-CM

## 2019-09-09 RX ORDER — CLINDAMYCIN PHOSPHATE 10 MG/ML
SOLUTION TOPICAL
Qty: 60 EACH | Refills: 5 | Status: SHIPPED
Start: 2019-09-09 | End: 2020-03-17

## 2019-09-09 NOTE — TELEPHONE ENCOUNTER
Last Appointment:  9/6/2019  Future Appointments   Date Time Provider Shi Escalantei   8/31/2020  5:00 PM ANNIE Jo - CHUY Jung

## 2019-09-13 DIAGNOSIS — F43.23 ADJUSTMENT DISORDER WITH MIXED ANXIETY AND DEPRESSED MOOD: ICD-10-CM

## 2019-09-13 RX ORDER — CITALOPRAM 20 MG/1
TABLET ORAL
Qty: 30 TABLET | Refills: 5 | Status: SHIPPED
Start: 2019-09-13 | End: 2020-02-13

## 2019-09-16 RX ORDER — HYDROXYZINE HYDROCHLORIDE 25 MG/1
25 TABLET, FILM COATED ORAL NIGHTLY PRN
Qty: 30 TABLET | Refills: 0 | Status: SHIPPED
Start: 2019-09-16 | End: 2020-06-08

## 2019-09-30 ENCOUNTER — TELEPHONE (OUTPATIENT)
Dept: FAMILY MEDICINE CLINIC | Age: 24
End: 2019-09-30

## 2019-09-30 DIAGNOSIS — K29.70 GASTRITIS: ICD-10-CM

## 2019-09-30 NOTE — TELEPHONE ENCOUNTER
Patient phoned and states she has a history of stomach ulcers and she is experiencing some of the same symptoms. She has had diarrhea for 1 month, no appetite, occasional vomiting. And just does not feel well. She has seen Dr Stephanie Palafox in the past.  Please advise.

## 2019-10-02 ENCOUNTER — HOSPITAL ENCOUNTER (OUTPATIENT)
Dept: ULTRASOUND IMAGING | Age: 24
Discharge: HOME | End: 2019-10-04
Payer: COMMERCIAL

## 2019-10-02 DIAGNOSIS — E04.2 MULTINODULAR GOITER: ICD-10-CM

## 2019-10-02 PROCEDURE — 76536 US EXAM OF HEAD AND NECK: CPT

## 2019-10-03 ENCOUNTER — TELEPHONE (OUTPATIENT)
Dept: ENDOCRINOLOGY | Age: 24
End: 2019-10-03

## 2019-12-05 ENCOUNTER — OFFICE VISIT (OUTPATIENT)
Dept: FAMILY MEDICINE CLINIC | Age: 24
End: 2019-12-05
Payer: COMMERCIAL

## 2019-12-05 VITALS
DIASTOLIC BLOOD PRESSURE: 70 MMHG | HEIGHT: 63 IN | SYSTOLIC BLOOD PRESSURE: 102 MMHG | HEART RATE: 85 BPM | WEIGHT: 126 LBS | BODY MASS INDEX: 22.32 KG/M2 | TEMPERATURE: 97.6 F | OXYGEN SATURATION: 99 %

## 2019-12-05 DIAGNOSIS — K52.9 CHRONIC DIARRHEA: Primary | ICD-10-CM

## 2019-12-05 PROCEDURE — 1036F TOBACCO NON-USER: CPT | Performed by: FAMILY MEDICINE

## 2019-12-05 PROCEDURE — G8427 DOCREV CUR MEDS BY ELIG CLIN: HCPCS | Performed by: FAMILY MEDICINE

## 2019-12-05 PROCEDURE — G8484 FLU IMMUNIZE NO ADMIN: HCPCS | Performed by: FAMILY MEDICINE

## 2019-12-05 PROCEDURE — 99214 OFFICE O/P EST MOD 30 MIN: CPT | Performed by: FAMILY MEDICINE

## 2019-12-05 PROCEDURE — G8420 CALC BMI NORM PARAMETERS: HCPCS | Performed by: FAMILY MEDICINE

## 2019-12-05 RX ORDER — DICYCLOMINE HYDROCHLORIDE 10 MG/1
10 CAPSULE ORAL 4 TIMES DAILY
Qty: 120 CAPSULE | Refills: 0 | Status: SHIPPED | OUTPATIENT
Start: 2019-12-05 | End: 2020-01-06

## 2019-12-06 DIAGNOSIS — K52.9 CHRONIC DIARRHEA: ICD-10-CM

## 2019-12-06 LAB
CONTROL: NORMAL
HEMOCCULT STL QL: NORMAL

## 2019-12-06 PROCEDURE — 82274 ASSAY TEST FOR BLOOD FECAL: CPT | Performed by: FAMILY MEDICINE

## 2020-01-06 RX ORDER — DICYCLOMINE HYDROCHLORIDE 10 MG/1
CAPSULE ORAL
Qty: 120 CAPSULE | Refills: 0 | Status: SHIPPED | OUTPATIENT
Start: 2020-01-06 | End: 2020-02-03

## 2020-01-06 NOTE — TELEPHONE ENCOUNTER
Last Appointment:  12/5/2019  Future Appointments   Date Time Provider Shi Orozco   8/31/2020  5:00 PM Eli Adrianna, APRN - CNP [facility]

## 2020-02-03 RX ORDER — DICYCLOMINE HYDROCHLORIDE 10 MG/1
CAPSULE ORAL
Qty: 120 CAPSULE | Refills: 5 | Status: SHIPPED
Start: 2020-02-03 | End: 2020-06-08

## 2020-02-13 RX ORDER — CITALOPRAM 20 MG/1
TABLET ORAL
Qty: 30 TABLET | Refills: 5 | Status: SHIPPED
Start: 2020-02-13 | End: 2020-06-08

## 2020-05-11 ENCOUNTER — HOSPITAL ENCOUNTER (OUTPATIENT)
Age: 25
Discharge: HOME | End: 2020-05-13
Payer: COMMERCIAL

## 2020-05-11 LAB
BACTERIA: ABNORMAL /HPF
BILIRUBIN URINE: NEGATIVE
BLOOD, URINE: ABNORMAL
CLARITY: ABNORMAL
COLOR: YELLOW
GLUCOSE URINE: NEGATIVE MG/DL
HBA1C MFR BLD: 4.9 % (ref 4–5.6)
HCT VFR BLD CALC: 41 % (ref 34–48)
HEMOGLOBIN: 13.3 G/DL (ref 11.5–15.5)
KETONES, URINE: NEGATIVE MG/DL
LEUKOCYTE ESTERASE, URINE: ABNORMAL
MCH RBC QN AUTO: 29.2 PG (ref 26–35)
MCHC RBC AUTO-ENTMCNC: 32.4 % (ref 32–34.5)
MCV RBC AUTO: 89.9 FL (ref 80–99.9)
NITRITE, URINE: NEGATIVE
PDW BLD-RTO: 14.9 FL (ref 11.5–15)
PH UA: 6 (ref 5–9)
PLATELET # BLD: 291 E9/L (ref 130–450)
PMV BLD AUTO: 11.1 FL (ref 7–12)
PROTEIN UA: NEGATIVE MG/DL
RBC # BLD: 4.56 E12/L (ref 3.5–5.5)
RBC UA: ABNORMAL /HPF (ref 0–2)
SPECIFIC GRAVITY UA: 1.02 (ref 1–1.03)
TSH SERPL DL<=0.05 MIU/L-ACNC: 0.4 UIU/ML (ref 0.27–4.2)
UROBILINOGEN, URINE: 0.2 E.U./DL
WBC # BLD: 9.6 E9/L (ref 4.5–11.5)
WBC UA: >20 /HPF (ref 0–5)
YEAST: PRESENT /HPF

## 2020-05-11 PROCEDURE — 87077 CULTURE AEROBIC IDENTIFY: CPT

## 2020-05-11 PROCEDURE — 86850 RBC ANTIBODY SCREEN: CPT

## 2020-05-11 PROCEDURE — 83036 HEMOGLOBIN GLYCOSYLATED A1C: CPT

## 2020-05-11 PROCEDURE — 86787 VARICELLA-ZOSTER ANTIBODY: CPT

## 2020-05-11 PROCEDURE — 85027 COMPLETE CBC AUTOMATED: CPT

## 2020-05-11 PROCEDURE — 86703 HIV-1/HIV-2 1 RESULT ANTBDY: CPT

## 2020-05-11 PROCEDURE — 86901 BLOOD TYPING SEROLOGIC RH(D): CPT

## 2020-05-11 PROCEDURE — 87340 HEPATITIS B SURFACE AG IA: CPT

## 2020-05-11 PROCEDURE — 87088 URINE BACTERIA CULTURE: CPT

## 2020-05-11 PROCEDURE — 81001 URINALYSIS AUTO W/SCOPE: CPT

## 2020-05-11 PROCEDURE — 86900 BLOOD TYPING SEROLOGIC ABO: CPT

## 2020-05-11 PROCEDURE — 86778 TOXOPLASMA ANTIBODY IGM: CPT

## 2020-05-11 PROCEDURE — 86777 TOXOPLASMA ANTIBODY: CPT

## 2020-05-11 PROCEDURE — 86762 RUBELLA ANTIBODY: CPT

## 2020-05-11 PROCEDURE — 84443 ASSAY THYROID STIM HORMONE: CPT

## 2020-05-11 PROCEDURE — 86592 SYPHILIS TEST NON-TREP QUAL: CPT

## 2020-05-11 PROCEDURE — 87186 SC STD MICRODIL/AGAR DIL: CPT

## 2020-05-12 LAB
ABO/RH: NORMAL
ANTIBODY SCREEN: NORMAL
HEPATITIS B SURFACE ANTIGEN INTERPRETATION: NORMAL
RPR: NORMAL
VARICELLA-ZOSTER VIRUS AB, IGG: NORMAL

## 2020-05-13 LAB
HIV-1 AND HIV-2 ANTIBODIES: NORMAL
ORGANISM: ABNORMAL
TOXOPLASMA IGM ANTIBODY: NORMAL
TOXOPLASMOSIS IGG AB: NORMAL
URINE CULTURE, ROUTINE: ABNORMAL

## 2020-05-14 LAB — RUBELLA ANTIBODY IGG: NORMAL

## 2020-06-08 ENCOUNTER — HOSPITAL ENCOUNTER (OUTPATIENT)
Age: 25
Discharge: HOME | End: 2020-06-10
Payer: COMMERCIAL

## 2020-06-08 PROCEDURE — 87591 N.GONORRHOEAE DNA AMP PROB: CPT

## 2020-06-08 PROCEDURE — 87491 CHLMYD TRACH DNA AMP PROBE: CPT

## 2020-06-08 PROCEDURE — 88175 CYTOPATH C/V AUTO FLUID REDO: CPT

## 2020-06-11 LAB
CHLAMYDIA BY THIN PREP: NEGATIVE
N. GONORRHOEAE DNA, THIN PREP: NEGATIVE
SOURCE: NORMAL

## 2020-09-11 ENCOUNTER — HOSPITAL ENCOUNTER (OUTPATIENT)
Age: 25
Discharge: HOME | End: 2020-09-13
Payer: COMMERCIAL

## 2020-09-11 LAB
GLUCOSE TOLERANCE TEST 1 HOUR: 73 MG/DL
GLUCOSE TOLERANCE TEST 2 HOUR: 56 MG/DL
GLUCOSE TOLERANCE TEST FASTING: 75 MG/DL
HCT VFR BLD CALC: 34.9 % (ref 34–48)
HEMOGLOBIN: 10.6 G/DL (ref 11.5–15.5)
MCH RBC QN AUTO: 27 PG (ref 26–35)
MCHC RBC AUTO-ENTMCNC: 30.4 % (ref 32–34.5)
MCV RBC AUTO: 88.8 FL (ref 80–99.9)
PDW BLD-RTO: 13.8 FL (ref 11.5–15)
PLATELET # BLD: 309 E9/L (ref 130–450)
PMV BLD AUTO: 10.7 FL (ref 7–12)
RBC # BLD: 3.93 E12/L (ref 3.5–5.5)
WBC # BLD: 8.8 E9/L (ref 4.5–11.5)

## 2020-09-11 PROCEDURE — 86900 BLOOD TYPING SEROLOGIC ABO: CPT

## 2020-09-11 PROCEDURE — 85027 COMPLETE CBC AUTOMATED: CPT

## 2020-09-11 PROCEDURE — 82951 GLUCOSE TOLERANCE TEST (GTT): CPT

## 2020-09-11 PROCEDURE — 86850 RBC ANTIBODY SCREEN: CPT

## 2020-09-11 PROCEDURE — 86901 BLOOD TYPING SEROLOGIC RH(D): CPT

## 2020-09-12 LAB
ABO/RH: NORMAL
ANTIBODY SCREEN: NORMAL

## 2020-09-23 ENCOUNTER — HOSPITAL ENCOUNTER (OUTPATIENT)
Age: 25
Discharge: HOME | End: 2020-09-23
Attending: OBSTETRICS & GYNECOLOGY | Admitting: OBSTETRICS & GYNECOLOGY
Payer: COMMERCIAL

## 2020-09-23 VITALS
WEIGHT: 162 LBS | HEART RATE: 79 BPM | TEMPERATURE: 98.3 F | SYSTOLIC BLOOD PRESSURE: 108 MMHG | BODY MASS INDEX: 28.7 KG/M2 | HEIGHT: 63 IN | DIASTOLIC BLOOD PRESSURE: 64 MMHG | RESPIRATION RATE: 16 BRPM

## 2020-09-23 PROBLEM — R55 SYNCOPE AND COLLAPSE: Status: ACTIVE | Noted: 2020-09-23

## 2020-09-23 LAB
ALBUMIN SERPL-MCNC: 3.7 G/DL (ref 3.5–5.2)
ALP BLD-CCNC: 67 U/L (ref 35–104)
ALT SERPL-CCNC: 9 U/L (ref 0–32)
ANION GAP SERPL CALCULATED.3IONS-SCNC: 11 MMOL/L (ref 7–16)
AST SERPL-CCNC: 16 U/L (ref 0–31)
BILIRUB SERPL-MCNC: 1 MG/DL (ref 0–1.2)
BILIRUBIN URINE: NEGATIVE
BLOOD, URINE: NEGATIVE
BUN BLDV-MCNC: 8 MG/DL (ref 6–20)
CALCIUM SERPL-MCNC: 9.2 MG/DL (ref 8.6–10.2)
CHLORIDE BLD-SCNC: 101 MMOL/L (ref 98–107)
CLARITY: CLEAR
CO2: 23 MMOL/L (ref 22–29)
COLOR: YELLOW
CREAT SERPL-MCNC: 0.5 MG/DL (ref 0.5–1)
GFR AFRICAN AMERICAN: >60
GFR NON-AFRICAN AMERICAN: >60 ML/MIN/1.73
GLUCOSE BLD-MCNC: 95 MG/DL (ref 74–99)
GLUCOSE URINE: NEGATIVE MG/DL
HCT VFR BLD CALC: 33.1 % (ref 34–48)
HEMOGLOBIN: 10.5 G/DL (ref 11.5–15.5)
KETONES, URINE: NEGATIVE MG/DL
LEUKOCYTE ESTERASE, URINE: NEGATIVE
MCH RBC QN AUTO: 26.8 PG (ref 26–35)
MCHC RBC AUTO-ENTMCNC: 31.7 % (ref 32–34.5)
MCV RBC AUTO: 84.4 FL (ref 80–99.9)
NITRITE, URINE: NEGATIVE
PDW BLD-RTO: 14.2 FL (ref 11.5–15)
PH UA: 7 (ref 5–9)
PLATELET # BLD: 321 E9/L (ref 130–450)
PMV BLD AUTO: 10.5 FL (ref 7–12)
POTASSIUM SERPL-SCNC: 3.5 MMOL/L (ref 3.5–5)
PROTEIN UA: NEGATIVE MG/DL
RBC # BLD: 3.92 E12/L (ref 3.5–5.5)
SODIUM BLD-SCNC: 135 MMOL/L (ref 132–146)
SPECIFIC GRAVITY UA: <=1.005 (ref 1–1.03)
TOTAL PROTEIN: 7 G/DL (ref 6.4–8.3)
UROBILINOGEN, URINE: 0.2 E.U./DL
WBC # BLD: 10.5 E9/L (ref 4.5–11.5)

## 2020-09-23 PROCEDURE — 85027 COMPLETE CBC AUTOMATED: CPT

## 2020-09-23 PROCEDURE — 36415 COLL VENOUS BLD VENIPUNCTURE: CPT

## 2020-09-23 PROCEDURE — 80053 COMPREHEN METABOLIC PANEL: CPT

## 2020-09-23 PROCEDURE — 6370000000 HC RX 637 (ALT 250 FOR IP): Performed by: OBSTETRICS & GYNECOLOGY

## 2020-09-23 PROCEDURE — 81003 URINALYSIS AUTO W/O SCOPE: CPT

## 2020-09-23 PROCEDURE — 99201 HC NEW PT, OUTPT VISIT LEVEL 1: CPT

## 2020-09-23 PROCEDURE — 2580000003 HC RX 258: Performed by: NURSE PRACTITIONER

## 2020-09-23 RX ORDER — SODIUM CHLORIDE, SODIUM LACTATE, POTASSIUM CHLORIDE, CALCIUM CHLORIDE 600; 310; 30; 20 MG/100ML; MG/100ML; MG/100ML; MG/100ML
INJECTION, SOLUTION INTRAVENOUS CONTINUOUS
Status: DISCONTINUED | OUTPATIENT
Start: 2020-09-23 | End: 2020-09-23 | Stop reason: HOSPADM

## 2020-09-23 RX ORDER — AMMONIA INHALANTS 0.04 G/.3ML
0.3 INHALANT RESPIRATORY (INHALATION) ONCE
Status: COMPLETED | OUTPATIENT
Start: 2020-09-23 | End: 2020-09-23

## 2020-09-23 RX ORDER — SODIUM CHLORIDE, SODIUM LACTATE, POTASSIUM CHLORIDE, AND CALCIUM CHLORIDE .6; .31; .03; .02 G/100ML; G/100ML; G/100ML; G/100ML
500 INJECTION, SOLUTION INTRAVENOUS ONCE
Status: COMPLETED | OUTPATIENT
Start: 2020-09-23 | End: 2020-09-23

## 2020-09-23 RX ADMIN — SODIUM CHLORIDE, POTASSIUM CHLORIDE, SODIUM LACTATE AND CALCIUM CHLORIDE 500 ML: 600; 310; 30; 20 INJECTION, SOLUTION INTRAVENOUS at 15:45

## 2020-09-23 RX ADMIN — AMMONIA INHALANTS 0.3 ML: 0.04 INHALANT RESPIRATORY (INHALATION) at 15:43

## 2020-09-23 NOTE — PROGRESS NOTES
Called Dr. Radha Monroe, updated that patient is feeling much better. BP have been within normal limits. Lab and urinalysis results reviewed. Discharge order received.

## 2020-09-23 NOTE — PROGRESS NOTES
Patient presents to L&D after \"seizure\" while getting her nails done. Per pt, she felt \"dizzy and clammy\" then nail tech told her she slumped over in the chair, threw up, and lost consciousness for approximately 1 minute. Nail tech called 911, patient refused ambulance, and was driven to the hospital by [employer]. Pt denies any history of seizures. Denies vaginal bleeding, leaking of fluid, or contractions. States good fetal movement. IUP at 29.3 weeks, , previous c/s.

## 2020-09-23 NOTE — PROGRESS NOTES
Discharge instructions given and explained to patient. Instructed patient to keep next schedule appointment on Friday with Dr. Trista Teran. Instructed patient to call if symptoms return or worsen. Patient verbalized understanding of instructions.

## 2020-10-08 RX ORDER — TRIAMCINOLONE ACETONIDE 5 MG/G
CREAM TOPICAL
Qty: 30 G | Refills: 2 | Status: SHIPPED | OUTPATIENT
Start: 2020-10-08 | End: 2020-10-15

## 2020-10-08 NOTE — TELEPHONE ENCOUNTER
Last Appointment:  12/5/2019  Future Appointments   Date Time Provider Shi Escalantei   10/9/2020 10:00 AM Tommas Habermann, APRN - CNP [facility]   10/23/2020 10:15 AM Tommas Habermann, APRN - CNP [facility]   11/6/2020 10:00 AM Tommas Habermann, APRN - CNP [facility]      Pt asked pharmacy to call and reorder this for her.

## 2020-10-21 ENCOUNTER — HOSPITAL ENCOUNTER (OUTPATIENT)
Age: 25
Setting detail: OBSERVATION
Discharge: HOME | End: 2020-10-21
Attending: OBSTETRICS & GYNECOLOGY | Admitting: OBSTETRICS & GYNECOLOGY
Payer: COMMERCIAL

## 2020-10-21 VITALS
DIASTOLIC BLOOD PRESSURE: 74 MMHG | HEART RATE: 91 BPM | RESPIRATION RATE: 16 BRPM | SYSTOLIC BLOOD PRESSURE: 120 MMHG | TEMPERATURE: 98.5 F

## 2020-10-21 PROBLEM — Z3A.33 33 WEEKS GESTATION OF PREGNANCY: Status: ACTIVE | Noted: 2020-10-21

## 2020-10-21 PROCEDURE — 99211 OFF/OP EST MAY X REQ PHY/QHP: CPT

## 2020-10-21 NOTE — H&P
Department of Obstetrics and Gynecology  Labor and Delivery  History & Physical    Patient:  Melida Higuera     Admit Date:  10/21/2020  4:28 AM  Medical Record Number:  [id number]    CHIEF COMPLAINT: IUP 33 weeks 3 days complaining of leaking amniotic fluid    PROBLEM LIST:     Patient Active Problem List   Diagnosis    GBS (group B Streptococcus carrier), +RV culture, currently pregnant    Maternal varicella, non-immune    Wound infection following  section, postpartum    Multinodular goiter    Syncope and collapse    33 weeks gestation of pregnancy           HISTORY OF PRESENT ILLNESS:    The patient is a 30 y.o. W female  at 33w3d. Patient presents with a chief complaint of leaking amniotic fluid since 7 days ago. Patient states she has been feeling wet on her. This evening at around 3:00 in the morning she had a big gush of fluid. She started having some cramping after that. She denies contractions, bleeding. No symptoms of UTI or PIH. There is good fetal movement. There have been no further leakage since this morning. ESTIMATED DUE DATE: Estimated Date of Delivery: 20    PRENATAL CARE:  Complicated by: none  GBS: not done    Past OB History  OB History        2    Para   1    Term   1            AB        Living   1       SAB        TAB        Ectopic        Molar        Multiple        Live Births   1                Past Medical History:        Diagnosis Date    Depression     no medications    GERD (gastroesophageal reflux disease)     Hiatal hernia     PONV (postoperative nausea and vomiting)     Ulcer        Past Surgical History:        Procedure Laterality Date     SECTION  2012    ENDOSCOPY, COLON, DIAGNOSTIC  2015    HYSTEROSCOPY  2018    D&C, polypectomy    INCISE AND DRAIN ABCESS  7/3/2012         UPPER GASTROINTESTINAL ENDOSCOPY  2015       Allergies: Iv dye [iodides];  Medrol [methylprednisolone]; and Percocet [oxycodone-acetaminophen]    Social History:    Social History     Socioeconomic History    Marital status:      Spouse name: Not on file    Number of children: Not on file    Years of education: Not on file    Highest education level: Not on file   Occupational History    Not on file   Social Needs    Financial resource strain: Not on file    Food insecurity     Worry: Not on file     Inability: Not on file    Transportation needs     Medical: Not on file     Non-medical: Not on file   Tobacco Use    Smoking status: Never Smoker    Smokeless tobacco: Never Used   Substance and Sexual Activity    Alcohol use: No     Alcohol/week: 0.0 standard drinks    Drug use: No    Sexual activity: Yes     Partners: Male     Birth control/protection: Inserts     Comment: Nuva Ring   Lifestyle    Physical activity     Days per week: Not on file     Minutes per session: Not on file    Stress: Not on file   Relationships    Social connections     Talks on phone: Not on file     Gets together: Not on file     Attends [employer] service: Not on file     Active member of club or organization: Not on file     Attends meetings of clubs or organizations: Not on file     Relationship status: Not on file    Intimate partner violence     Fear of current or ex partner: Not on file     Emotionally abused: Not on file     Physically abused: Not on file     Forced sexual activity: Not on file   Other Topics Concern    Not on file   Social History Narrative    Not on file       Family History:       Problem Relation Age of Onset    Heart Attack Father 45        LAD    Diabetes Maternal Grandmother     Hypertension Maternal Grandfather        Medications Prior to Admission:  Medications Prior to Admission: calcium carbonate (TUMS) 500 MG chewable tablet, Take 1 tablet by mouth daily  Prenat w/o K-ZA-Vlqoutl-FA-DHA (PNV-DHA) 27-0.6-0.4-300 MG CAPS, Take by mouth  CLINDAMYCIN PHOSPHATE,TOPICAL, 1 % SWAB, SWAB FACE ONCE TO

## 2020-10-21 NOTE — PROGRESS NOTES
[id number] presents to antepartum for complaints of loss of fluid x7 days. Patient denies vaginal bleeding. Feels + fetal movement. Orientated to policies and procedures normally done. Placed on EFM. Call light within reach.

## 2020-11-06 DIAGNOSIS — Z34.83 MULTIGRAVIDA IN THIRD TRIMESTER: ICD-10-CM

## 2020-11-08 LAB
GROUP B STREP CULTURE: ABNORMAL
ORGANISM: ABNORMAL

## 2020-11-11 DIAGNOSIS — Z34.83 MULTIGRAVIDA IN THIRD TRIMESTER: ICD-10-CM

## 2020-11-11 LAB
C TRACH DNA GENITAL QL NAA+PROBE: NEGATIVE
HCT VFR BLD CALC: 33.7 % (ref 34–48)
HEMOGLOBIN: 9.8 G/DL (ref 11.5–15.5)
MCH RBC QN AUTO: 23.5 PG (ref 26–35)
MCHC RBC AUTO-ENTMCNC: 29.1 % (ref 32–34.5)
MCV RBC AUTO: 80.8 FL (ref 80–99.9)
N. GONORRHOEAE DNA: NEGATIVE
PDW BLD-RTO: 16 FL (ref 11.5–15)
PLATELET # BLD: 314 E9/L (ref 130–450)
PMV BLD AUTO: 11.5 FL (ref 7–12)
RBC # BLD: 4.17 E12/L (ref 3.5–5.5)
SOURCE: NORMAL
WBC # BLD: 9.2 E9/L (ref 4.5–11.5)

## 2020-11-20 PROBLEM — D50.9 IRON DEFICIENCY ANEMIA OF PREGNANCY: Status: ACTIVE | Noted: 2020-11-20

## 2020-11-20 PROBLEM — O34.219 PREVIOUS CESAREAN DELIVERY AFFECTING PREGNANCY: Status: ACTIVE | Noted: 2020-11-20

## 2020-11-20 PROBLEM — O99.820 GROUP B STREPTOCOCCUS CARRIER, +RV CULTURE, CURRENTLY PREGNANT: Status: ACTIVE | Noted: 2020-11-20

## 2020-11-20 PROBLEM — O34.219 PATIENT DESIRES VAGINAL BIRTH AFTER CESAREAN SECTION (VBAC): Status: ACTIVE | Noted: 2020-11-20

## 2020-11-20 PROBLEM — Z34.83 MULTIGRAVIDA IN THIRD TRIMESTER: Status: ACTIVE | Noted: 2020-11-20

## 2020-11-20 PROBLEM — O99.019 IRON DEFICIENCY ANEMIA OF PREGNANCY: Status: ACTIVE | Noted: 2020-11-20

## 2020-11-30 ENCOUNTER — HOSPITAL ENCOUNTER (OUTPATIENT)
Age: 25
Discharge: HOME | End: 2020-12-02
Payer: COMMERCIAL

## 2020-11-30 PROCEDURE — U0003 INFECTIOUS AGENT DETECTION BY NUCLEIC ACID (DNA OR RNA); SEVERE ACUTE RESPIRATORY SYNDROME CORONAVIRUS 2 (SARS-COV-2) (CORONAVIRUS DISEASE [COVID-19]), AMPLIFIED PROBE TECHNIQUE, MAKING USE OF HIGH THROUGHPUT TECHNOLOGIES AS DESCRIBED BY CMS-2020-01-R: HCPCS

## 2020-12-02 LAB
SARS-COV-2: NOT DETECTED
SOURCE: NORMAL

## 2020-12-03 ENCOUNTER — ANESTHESIA EVENT (OUTPATIENT)
Dept: LABOR AND DELIVERY | Age: 25
DRG: 540 | End: 2020-12-03
Payer: COMMERCIAL

## 2020-12-04 ENCOUNTER — ANESTHESIA (OUTPATIENT)
Dept: LABOR AND DELIVERY | Age: 25
DRG: 540 | End: 2020-12-04
Payer: COMMERCIAL

## 2020-12-04 ENCOUNTER — HOSPITAL ENCOUNTER (INPATIENT)
Age: 25
LOS: 2 days | Discharge: HOME | DRG: 540 | End: 2020-12-06
Attending: OBSTETRICS & GYNECOLOGY | Admitting: OBSTETRICS & GYNECOLOGY
Payer: COMMERCIAL

## 2020-12-04 VITALS — DIASTOLIC BLOOD PRESSURE: 56 MMHG | SYSTOLIC BLOOD PRESSURE: 108 MMHG | OXYGEN SATURATION: 100 %

## 2020-12-04 PROBLEM — Z34.93 NORMAL PREGNANCY IN THIRD TRIMESTER: Status: ACTIVE | Noted: 2020-12-04

## 2020-12-04 PROBLEM — R55 SYNCOPE AND COLLAPSE: Status: RESOLVED | Noted: 2020-09-23 | Resolved: 2020-12-04

## 2020-12-04 PROBLEM — E04.2 MULTINODULAR GOITER: Status: RESOLVED | Noted: 2018-10-11 | Resolved: 2020-12-04

## 2020-12-04 PROBLEM — Z3A.39 39 WEEKS GESTATION OF PREGNANCY: Status: ACTIVE | Noted: 2020-10-21

## 2020-12-04 LAB
ABO/RH: NORMAL
ALBUMIN SERPL-MCNC: 2.9 G/DL (ref 3.5–5.2)
ALP BLD-CCNC: 88 U/L (ref 35–104)
ALT SERPL-CCNC: 6 U/L (ref 0–32)
AMPHETAMINE SCREEN, URINE: NOT DETECTED
ANION GAP SERPL CALCULATED.3IONS-SCNC: 9 MMOL/L (ref 7–16)
ANTIBODY SCREEN: NORMAL
AST SERPL-CCNC: 16 U/L (ref 0–31)
BARBITURATE SCREEN URINE: NOT DETECTED
BENZODIAZEPINE SCREEN, URINE: NOT DETECTED
BILIRUB SERPL-MCNC: 0.6 MG/DL (ref 0–1.2)
BUN BLDV-MCNC: 9 MG/DL (ref 6–20)
CALCIUM SERPL-MCNC: 8.5 MG/DL (ref 8.6–10.2)
CANNABINOID SCREEN URINE: NOT DETECTED
CHLORIDE BLD-SCNC: 101 MMOL/L (ref 98–107)
CO2: 22 MMOL/L (ref 22–29)
COCAINE METABOLITE SCREEN URINE: NOT DETECTED
CREAT SERPL-MCNC: 0.6 MG/DL (ref 0.5–1)
FENTANYL SCREEN, URINE: NOT DETECTED
GFR AFRICAN AMERICAN: >60
GFR NON-AFRICAN AMERICAN: >60 ML/MIN/1.73
GLUCOSE BLD-MCNC: 76 MG/DL (ref 74–99)
HCT VFR BLD CALC: 30.3 % (ref 34–48)
HEMOGLOBIN: 9 G/DL (ref 11.5–15.5)
Lab: NORMAL
MCH RBC QN AUTO: 22.7 PG (ref 26–35)
MCHC RBC AUTO-ENTMCNC: 29.7 % (ref 32–34.5)
MCV RBC AUTO: 76.3 FL (ref 80–99.9)
METHADONE SCREEN, URINE: NOT DETECTED
OPIATE SCREEN URINE: NOT DETECTED
OXYCODONE URINE: NOT DETECTED
PDW BLD-RTO: 17.6 FL (ref 11.5–15)
PHENCYCLIDINE SCREEN URINE: NOT DETECTED
PLATELET # BLD: 302 E9/L (ref 130–450)
PMV BLD AUTO: 11 FL (ref 7–12)
POTASSIUM SERPL-SCNC: 4 MMOL/L (ref 3.5–5)
RBC # BLD: 3.97 E12/L (ref 3.5–5.5)
SODIUM BLD-SCNC: 132 MMOL/L (ref 132–146)
TOTAL PROTEIN: 5.6 G/DL (ref 6.4–8.3)
WBC # BLD: 8.1 E9/L (ref 4.5–11.5)

## 2020-12-04 PROCEDURE — 2580000003 HC RX 258: Performed by: OBSTETRICS & GYNECOLOGY

## 2020-12-04 PROCEDURE — 2500000003 HC RX 250 WO HCPCS: Performed by: OBSTETRICS & GYNECOLOGY

## 2020-12-04 PROCEDURE — 6360000002 HC RX W HCPCS: Performed by: NURSE ANESTHETIST, CERTIFIED REGISTERED

## 2020-12-04 PROCEDURE — 3700000000 HC ANESTHESIA ATTENDED CARE: Performed by: OBSTETRICS & GYNECOLOGY

## 2020-12-04 PROCEDURE — 36415 COLL VENOUS BLD VENIPUNCTURE: CPT

## 2020-12-04 PROCEDURE — 86900 BLOOD TYPING SEROLOGIC ABO: CPT

## 2020-12-04 PROCEDURE — 80053 COMPREHEN METABOLIC PANEL: CPT

## 2020-12-04 PROCEDURE — 6370000000 HC RX 637 (ALT 250 FOR IP): Performed by: OBSTETRICS & GYNECOLOGY

## 2020-12-04 PROCEDURE — 3700000001 HC ADD 15 MINUTES (ANESTHESIA): Performed by: OBSTETRICS & GYNECOLOGY

## 2020-12-04 PROCEDURE — 86901 BLOOD TYPING SEROLOGIC RH(D): CPT

## 2020-12-04 PROCEDURE — 6360000002 HC RX W HCPCS: Performed by: ANESTHESIOLOGY

## 2020-12-04 PROCEDURE — 7100000000 HC PACU RECOVERY - FIRST 15 MIN: Performed by: OBSTETRICS & GYNECOLOGY

## 2020-12-04 PROCEDURE — 86850 RBC ANTIBODY SCREEN: CPT

## 2020-12-04 PROCEDURE — 2709999900 HC NON-CHARGEABLE SUPPLY: Performed by: OBSTETRICS & GYNECOLOGY

## 2020-12-04 PROCEDURE — 3609079900 HC CESAREAN SECTION: Performed by: OBSTETRICS & GYNECOLOGY

## 2020-12-04 PROCEDURE — 6370000000 HC RX 637 (ALT 250 FOR IP): Performed by: ANESTHESIOLOGY

## 2020-12-04 PROCEDURE — 2500000003 HC RX 250 WO HCPCS: Performed by: NURSE ANESTHETIST, CERTIFIED REGISTERED

## 2020-12-04 PROCEDURE — 6360000002 HC RX W HCPCS: Performed by: OBSTETRICS & GYNECOLOGY

## 2020-12-04 PROCEDURE — 7100000001 HC PACU RECOVERY - ADDTL 15 MIN: Performed by: OBSTETRICS & GYNECOLOGY

## 2020-12-04 PROCEDURE — 59514 CESAREAN DELIVERY ONLY: CPT | Performed by: PHYSICIAN ASSISTANT

## 2020-12-04 PROCEDURE — 80307 DRUG TEST PRSMV CHEM ANLYZR: CPT

## 2020-12-04 PROCEDURE — 85027 COMPLETE CBC AUTOMATED: CPT

## 2020-12-04 PROCEDURE — 1220000000 HC SEMI PRIVATE OB R&B

## 2020-12-04 RX ORDER — SODIUM CHLORIDE, SODIUM LACTATE, POTASSIUM CHLORIDE, AND CALCIUM CHLORIDE .6; .31; .03; .02 G/100ML; G/100ML; G/100ML; G/100ML
1000 INJECTION, SOLUTION INTRAVENOUS ONCE
Status: COMPLETED | OUTPATIENT
Start: 2020-12-04 | End: 2020-12-04

## 2020-12-04 RX ORDER — FAMOTIDINE 20 MG/1
20 TABLET, FILM COATED ORAL 2 TIMES DAILY
Status: DISCONTINUED | OUTPATIENT
Start: 2020-12-05 | End: 2020-12-06 | Stop reason: HOSPADM

## 2020-12-04 RX ORDER — SIMETHICONE 80 MG
80 TABLET,CHEWABLE ORAL EVERY 6 HOURS PRN
Status: DISCONTINUED | OUTPATIENT
Start: 2020-12-04 | End: 2020-12-06 | Stop reason: HOSPADM

## 2020-12-04 RX ORDER — FENTANYL CITRATE 50 UG/ML
25 INJECTION, SOLUTION INTRAMUSCULAR; INTRAVENOUS EVERY 5 MIN PRN
Status: DISCONTINUED | OUTPATIENT
Start: 2020-12-04 | End: 2020-12-04

## 2020-12-04 RX ORDER — ONDANSETRON 2 MG/ML
INJECTION INTRAMUSCULAR; INTRAVENOUS PRN
Status: DISCONTINUED | OUTPATIENT
Start: 2020-12-04 | End: 2020-12-04 | Stop reason: SDUPTHER

## 2020-12-04 RX ORDER — BISACODYL 10 MG
10 SUPPOSITORY, RECTAL RECTAL DAILY PRN
Status: DISCONTINUED | OUTPATIENT
Start: 2020-12-06 | End: 2020-12-06 | Stop reason: HOSPADM

## 2020-12-04 RX ORDER — PROMETHAZINE HYDROCHLORIDE 25 MG/ML
INJECTION, SOLUTION INTRAMUSCULAR; INTRAVENOUS PRN
Status: DISCONTINUED | OUTPATIENT
Start: 2020-12-04 | End: 2020-12-04 | Stop reason: SDUPTHER

## 2020-12-04 RX ORDER — FERROUS SULFATE 325(65) MG
325 TABLET ORAL 2 TIMES DAILY
Status: DISCONTINUED | OUTPATIENT
Start: 2020-12-04 | End: 2020-12-06 | Stop reason: HOSPADM

## 2020-12-04 RX ORDER — SODIUM CHLORIDE, SODIUM LACTATE, POTASSIUM CHLORIDE, CALCIUM CHLORIDE 600; 310; 30; 20 MG/100ML; MG/100ML; MG/100ML; MG/100ML
INJECTION, SOLUTION INTRAVENOUS CONTINUOUS
Status: DISCONTINUED | OUTPATIENT
Start: 2020-12-04 | End: 2020-12-06 | Stop reason: HOSPADM

## 2020-12-04 RX ORDER — SODIUM CHLORIDE 0.9 % (FLUSH) 0.9 %
10 SYRINGE (ML) INJECTION EVERY 12 HOURS SCHEDULED
Status: DISCONTINUED | OUTPATIENT
Start: 2020-12-04 | End: 2020-12-04

## 2020-12-04 RX ORDER — CALCIUM CARBONATE 200(500)MG
1 TABLET,CHEWABLE ORAL 2 TIMES DAILY PRN
Status: DISCONTINUED | OUTPATIENT
Start: 2020-12-04 | End: 2020-12-06 | Stop reason: HOSPADM

## 2020-12-04 RX ORDER — DIPHENHYDRAMINE HYDROCHLORIDE 50 MG/ML
12.5 INJECTION INTRAMUSCULAR; INTRAVENOUS EVERY 6 HOURS PRN
Status: ACTIVE | OUTPATIENT
Start: 2020-12-04 | End: 2020-12-05

## 2020-12-04 RX ORDER — BUPIVACAINE HYDROCHLORIDE 7.5 MG/ML
INJECTION, SOLUTION INTRASPINAL PRN
Status: DISCONTINUED | OUTPATIENT
Start: 2020-12-04 | End: 2020-12-04 | Stop reason: SDUPTHER

## 2020-12-04 RX ORDER — SODIUM CHLORIDE 0.9 % (FLUSH) 0.9 %
10 SYRINGE (ML) INJECTION EVERY 12 HOURS SCHEDULED
Status: DISCONTINUED | OUTPATIENT
Start: 2020-12-04 | End: 2020-12-06 | Stop reason: HOSPADM

## 2020-12-04 RX ORDER — OXYCODONE HYDROCHLORIDE 5 MG/1
5 TABLET ORAL EVERY 6 HOURS PRN
Status: DISCONTINUED | OUTPATIENT
Start: 2020-12-04 | End: 2020-12-06 | Stop reason: HOSPADM

## 2020-12-04 RX ORDER — OXYCODONE HYDROCHLORIDE AND ACETAMINOPHEN 5; 325 MG/1; MG/1
2 TABLET ORAL EVERY 4 HOURS PRN
Status: DISCONTINUED | OUTPATIENT
Start: 2020-12-05 | End: 2020-12-06 | Stop reason: HOSPADM

## 2020-12-04 RX ORDER — CLINDAMYCIN PHOSPHATE 10 MG/ML
1 SOLUTION TOPICAL DAILY
Status: DISCONTINUED | OUTPATIENT
Start: 2020-12-04 | End: 2020-12-06 | Stop reason: HOSPADM

## 2020-12-04 RX ORDER — KETOROLAC TROMETHAMINE 30 MG/ML
15 INJECTION, SOLUTION INTRAMUSCULAR; INTRAVENOUS EVERY 6 HOURS PRN
Status: DISPENSED | OUTPATIENT
Start: 2020-12-04 | End: 2020-12-05

## 2020-12-04 RX ORDER — IBUPROFEN 600 MG/1
600 TABLET ORAL EVERY 6 HOURS PRN
Status: DISCONTINUED | OUTPATIENT
Start: 2020-12-05 | End: 2020-12-06 | Stop reason: HOSPADM

## 2020-12-04 RX ORDER — SODIUM CHLORIDE 0.9 % (FLUSH) 0.9 %
10 SYRINGE (ML) INJECTION PRN
Status: DISCONTINUED | OUTPATIENT
Start: 2020-12-04 | End: 2020-12-04

## 2020-12-04 RX ORDER — PRENATAL WITH FERROUS FUM AND FOLIC ACID 3080; 920; 120; 400; 22; 1.84; 3; 20; 10; 1; 12; 200; 27; 25; 2 [IU]/1; [IU]/1; MG/1; [IU]/1; MG/1; MG/1; MG/1; MG/1; MG/1; MG/1; UG/1; MG/1; MG/1; MG/1; MG/1
1 TABLET ORAL
Status: DISCONTINUED | OUTPATIENT
Start: 2020-12-05 | End: 2020-12-06 | Stop reason: HOSPADM

## 2020-12-04 RX ORDER — HYDROCODONE BITARTRATE AND ACETAMINOPHEN 5; 325 MG/1; MG/1
1 TABLET ORAL EVERY 4 HOURS PRN
Status: ACTIVE | OUTPATIENT
Start: 2020-12-04 | End: 2020-12-05

## 2020-12-04 RX ORDER — HYDROCODONE BITARTRATE AND ACETAMINOPHEN 5; 325 MG/1; MG/1
2 TABLET ORAL EVERY 4 HOURS PRN
Status: ACTIVE | OUTPATIENT
Start: 2020-12-04 | End: 2020-12-05

## 2020-12-04 RX ORDER — TRISODIUM CITRATE DIHYDRATE AND CITRIC ACID MONOHYDRATE 500; 334 MG/5ML; MG/5ML
30 SOLUTION ORAL ONCE
Status: COMPLETED | OUTPATIENT
Start: 2020-12-04 | End: 2020-12-04

## 2020-12-04 RX ORDER — DOCUSATE SODIUM 100 MG/1
100 CAPSULE, LIQUID FILLED ORAL 2 TIMES DAILY
Status: DISCONTINUED | OUTPATIENT
Start: 2020-12-04 | End: 2020-12-06 | Stop reason: HOSPADM

## 2020-12-04 RX ORDER — ACETAMINOPHEN 325 MG/1
650 TABLET ORAL EVERY 4 HOURS PRN
Status: DISCONTINUED | OUTPATIENT
Start: 2020-12-04 | End: 2020-12-06 | Stop reason: HOSPADM

## 2020-12-04 RX ORDER — NALOXONE HYDROCHLORIDE 0.4 MG/ML
0.4 INJECTION, SOLUTION INTRAMUSCULAR; INTRAVENOUS; SUBCUTANEOUS PRN
Status: DISCONTINUED | OUTPATIENT
Start: 2020-12-04 | End: 2020-12-06 | Stop reason: HOSPADM

## 2020-12-04 RX ORDER — SODIUM CHLORIDE 0.9 % (FLUSH) 0.9 %
10 SYRINGE (ML) INJECTION PRN
Status: DISCONTINUED | OUTPATIENT
Start: 2020-12-04 | End: 2020-12-06 | Stop reason: HOSPADM

## 2020-12-04 RX ORDER — MODIFIED LANOLIN
OINTMENT (GRAM) TOPICAL
Status: DISCONTINUED | OUTPATIENT
Start: 2020-12-04 | End: 2020-12-06 | Stop reason: HOSPADM

## 2020-12-04 RX ORDER — AMMONIA INHALANTS 0.04 G/.3ML
INHALANT RESPIRATORY (INHALATION)
Status: DISCONTINUED
Start: 2020-12-04 | End: 2020-12-04

## 2020-12-04 RX ORDER — ONDANSETRON 2 MG/ML
4 INJECTION INTRAMUSCULAR; INTRAVENOUS EVERY 6 HOURS PRN
Status: ACTIVE | OUTPATIENT
Start: 2020-12-04 | End: 2020-12-05

## 2020-12-04 RX ORDER — MORPHINE SULFATE 1 MG/ML
INJECTION, SOLUTION EPIDURAL; INTRATHECAL; INTRAVENOUS PRN
Status: DISCONTINUED | OUTPATIENT
Start: 2020-12-04 | End: 2020-12-04 | Stop reason: SDUPTHER

## 2020-12-04 RX ORDER — OXYCODONE HYDROCHLORIDE AND ACETAMINOPHEN 5; 325 MG/1; MG/1
1 TABLET ORAL EVERY 4 HOURS PRN
Status: DISCONTINUED | OUTPATIENT
Start: 2020-12-05 | End: 2020-12-06 | Stop reason: HOSPADM

## 2020-12-04 RX ADMIN — BUPIVACAINE HYDROCHLORIDE IN DEXTROSE 1.6 ML: 7.5 INJECTION, SOLUTION SUBARACHNOID at 09:39

## 2020-12-04 RX ADMIN — PHENYLEPHRINE HYDROCHLORIDE 100 MCG: 10 INJECTION INTRAVENOUS at 10:09

## 2020-12-04 RX ADMIN — PROMETHAZINE HYDROCHLORIDE 6.25 MG: 25 INJECTION INTRAMUSCULAR; INTRAVENOUS at 10:32

## 2020-12-04 RX ADMIN — SODIUM CITRATE AND CITRIC ACID MONOHYDRATE 30 ML: 500; 334 SOLUTION ORAL at 09:28

## 2020-12-04 RX ADMIN — MORPHINE SULFATE 0.15 MG: 1 INJECTION, SOLUTION EPIDURAL; INTRATHECAL; INTRAVENOUS at 09:39

## 2020-12-04 RX ADMIN — Medication 909 ML/HR: at 10:06

## 2020-12-04 RX ADMIN — CEFAZOLIN 1 G: 1 INJECTION, POWDER, FOR SOLUTION INTRAMUSCULAR; INTRAVENOUS at 18:24

## 2020-12-04 RX ADMIN — FENTANYL CITRATE 25 MCG: 50 INJECTION, SOLUTION INTRAMUSCULAR; INTRAVENOUS at 11:47

## 2020-12-04 RX ADMIN — SODIUM CHLORIDE, PRESERVATIVE FREE 10 ML: 5 INJECTION INTRAVENOUS at 20:52

## 2020-12-04 RX ADMIN — Medication 2 G: at 09:28

## 2020-12-04 RX ADMIN — PHENYLEPHRINE HYDROCHLORIDE 200 MCG: 10 INJECTION INTRAVENOUS at 10:27

## 2020-12-04 RX ADMIN — DOCUSATE SODIUM 100 MG: 100 CAPSULE ORAL at 20:51

## 2020-12-04 RX ADMIN — KETOROLAC TROMETHAMINE 15 MG: 30 INJECTION, SOLUTION INTRAMUSCULAR at 20:51

## 2020-12-04 RX ADMIN — Medication 10 ML: at 08:30

## 2020-12-04 RX ADMIN — FAMOTIDINE 20 MG: 10 INJECTION INTRAVENOUS at 22:18

## 2020-12-04 RX ADMIN — PHENYLEPHRINE HYDROCHLORIDE 100 MCG: 10 INJECTION INTRAVENOUS at 10:22

## 2020-12-04 RX ADMIN — KETOROLAC TROMETHAMINE 15 MG: 30 INJECTION, SOLUTION INTRAMUSCULAR at 14:27

## 2020-12-04 RX ADMIN — PHENYLEPHRINE HYDROCHLORIDE 200 MCG: 10 INJECTION INTRAVENOUS at 09:46

## 2020-12-04 RX ADMIN — PHENYLEPHRINE HYDROCHLORIDE 100 MCG: 10 INJECTION INTRAVENOUS at 10:03

## 2020-12-04 RX ADMIN — PHENYLEPHRINE HYDROCHLORIDE 200 MCG: 10 INJECTION INTRAVENOUS at 10:33

## 2020-12-04 RX ADMIN — ONDANSETRON 4 MG: 2 INJECTION INTRAMUSCULAR; INTRAVENOUS at 10:06

## 2020-12-04 RX ADMIN — OXYCODONE HYDROCHLORIDE 5 MG: 5 TABLET ORAL at 16:16

## 2020-12-04 RX ADMIN — PHENYLEPHRINE HYDROCHLORIDE 100 MCG: 10 INJECTION INTRAVENOUS at 09:56

## 2020-12-04 RX ADMIN — OXYCODONE HYDROCHLORIDE 5 MG: 5 TABLET ORAL at 22:21

## 2020-12-04 RX ADMIN — PHENYLEPHRINE HYDROCHLORIDE 200 MCG: 10 INJECTION INTRAVENOUS at 09:53

## 2020-12-04 RX ADMIN — SODIUM CHLORIDE, POTASSIUM CHLORIDE, SODIUM LACTATE AND CALCIUM CHLORIDE 1000 ML: 600; 310; 30; 20 INJECTION, SOLUTION INTRAVENOUS at 08:30

## 2020-12-04 RX ADMIN — PHENYLEPHRINE HYDROCHLORIDE 100 MCG: 10 INJECTION INTRAVENOUS at 10:15

## 2020-12-04 RX ADMIN — SODIUM CHLORIDE, PRESERVATIVE FREE 10 ML: 5 INJECTION INTRAVENOUS at 22:18

## 2020-12-04 RX ADMIN — PHENYLEPHRINE HYDROCHLORIDE 100 MCG: 10 INJECTION INTRAVENOUS at 09:59

## 2020-12-04 RX ADMIN — SODIUM CHLORIDE, POTASSIUM CHLORIDE, SODIUM LACTATE AND CALCIUM CHLORIDE: 600; 310; 30; 20 INJECTION, SOLUTION INTRAVENOUS at 09:32

## 2020-12-04 RX ADMIN — PHENYLEPHRINE HYDROCHLORIDE 100 MCG: 10 INJECTION INTRAVENOUS at 09:42

## 2020-12-04 ASSESSMENT — LIFESTYLE VARIABLES: SMOKING_STATUS: 0

## 2020-12-04 ASSESSMENT — PAIN SCALES - GENERAL
PAINLEVEL_OUTOF10: 4
PAINLEVEL_OUTOF10: 6
PAINLEVEL_OUTOF10: 4
PAINLEVEL_OUTOF10: 6
PAINLEVEL_OUTOF10: 5

## 2020-12-04 NOTE — LACTATION NOTE
Patient requests breastfeeding help. Hand expressing drops of colostrum. Assisted with latch in laid back position. Baby attached well to nipple,  patient denies discomfort. Observed effective latch with audible swallows, baby nursed on both breasts. Instructed on normal infant behavior in the first 12-24 hrs and benefits of skin to skin. Reviewed waking techniques and the importance of frequent feedings- 8-12 times/ 24 hrs. Taught how to recognize feeding cues. Encouraged to feed infant as often and for as long as the infant wishes to do so. Has electric breast pump at home.

## 2020-12-04 NOTE — OP NOTE
Assistant: Alo Stovall PA-C    Anesthesia: Spinal    Complications: None    Estimated Blood Loss (mL): 500    Crystalloid Replaced (mL): 1700    Urine Output (mL): 200, clear    Drains:   Urethral Catheter Non-latex 16 fr (Active)     Intraoperative Medication: IV Ancef 2 g on-call to the OR, IV Pitocin drip after delivery of placenta. Indication For Procedure: The patient is a 30 y.o. W female  at 38w11d. Patient presents with a chief complaint of prior  section and persistent ellen breech presentation since 36 weeks - patient was initially planning to [address] if baby was vertex and presented withspontaneous labor or a favorable cervix prior to her  section date. Last exam 20 BS=1 and US confirmed ellen breech. Patient denies contractions, LOF, VB. No sx UTI or PIH. Good FM. Management options were reviewed - patient had been interested in  if spontaneous labor or favorable cervix for IOL by the scheduled  section date - the patient's cervix has been unfavorable and BS=1 today with a -3 station, the risks of surgery were discussed, including, but not limited to that of anesthesia, infection, hemorrhage, transfusion, blood borne disease, bowel/bladder/ureteral/vascular injury, and any corrective surgeries (bladder, ureter, bowel, hysterectomy), uterine or cervical lacerations, injury to the baby, DVT,PE, pain and death. Questions were answered to both the patient's and FOB/family satisfaction and informed consent was obtained to proceed as planned. Note: Presenting part had been breech since 36 weeks - no need for US today as the decision to proceed with the scheduled  delivery was already made and would not change the plan. Findings:  Live Born 20 at 1  Sex:   Male  Fetal Position:  Cephalic, left occiput anterior  Apgars:  1 minute:  9; 5 minute:  9  Weight:  7# 13oz, 3530 grams  Nuchal Cord: was present and reduced  Placenta: was delivered with gentle traction and was noted to be intact, whole and that the umbilical cord had three vessels noted. Tubes, uterus, ovaries:  Normal    Specimens:   * No specimens in log *    Implants:  * No implants in log *      Condition:    Infant stable, transfer to postanesthesia recovery then to [facility] Nurs[facility]  Mother stable, transfer to post anesthesia recovery then to Maternity    Detailed Description of Procedure: The patient was taken to the operating room, where patient identification was confirmed and a Time Out was performed. Duramorph spinal anesthesia was found to be adequate. She was prepped with a DuraPrep solution and draped in the usual sterile fashion in the dorsal supine position with leftward tilt. A Pfannenstiel skin incision was then made with a scalpel in an elliptical fashion around the previous  section scar and carried down through the subcutaneous tissue in a wedgelike fashion to the underlying lay of fascia with the Bovie. The fascia was nicked in the midline and incision was extended laterally with the Lozada scissors. Superior aspect of the fascial incision was grasped with Kocher clamps and elevated. The underlying rectus muscle was dissected using sharp and blunt dissection. Attention was then turned to the inferior aspect of this incision, which in a similar fashion was grasped with Kocher clamps and elevated. The underlying rectus muscle was dissected off using sharp and blunt dissection. The rectus muscles were then  in the midline, peritoneum identified, tented up, and entered with the Metzenbaum scissors. The peritoneal incision was extended superiorly and inferiorly with good visualization of the bladder. The bladder blade was then inserted and the vesicouterine peritoneum identified, grasped with pickups, entered sharply with Metzenbaum scissors. This incision was extended laterally and the bladder flap created digitally.   The bladder blade was then reinserted and the lower uterine segment incised in a transverse fasion with the scalpel. The uterine incision was then extended laterally with blunt digital dissection. The bladder blade was then removed and the infant's head delivered atraumatically with the appropriate amount of fundal pressure. The nose and mouth were suctioned with bulb suction and the remaining infant was delivered. The cord was clamped and cut after a 30 second delay. The infant was handed off to the waiting nurses. The placenta was then removed with gentle cord traction and the uterus exteriorized and cleared of all clot and debris. The uterine incision was then repaired with 1 Chromic in a running, locked fashion. A second layer of the same suture was used to obtain excellent hemostasis. The bladder flap was repaired with 3-0 Vicryl in a running stitch and the uterus was returned to the abdomen. The gutters were then cleared of all clot and debris. The anterior abdominal wall peritoneum was closed with a running stitch of 3-0 Vicryl. The fascia was reapproximated with 1 Stratafix in a running fashion. The subcutaneous tissue was reapproximated in two layers, using a running stitch of 3-0 plain suture. The skin was closed with a running subcuticular stitch of 4-0 Stratafix. Dermabond skin glue was applied as a sterile dressing. The patient tolerated the procedure well. Sponge, lap, needle, and instruments were correct x2. Two g of Ancef were given on call to the OR, followed by Pitocin drip after delivery of the placenta.   The patient was taken to the Recovery Room in awake, stable, postoperative state    Electronically signed by John Moyer MD on 12/4/2020 at 12:16 PM

## 2020-12-04 NOTE — PROGRESS NOTES
present to L&D for scheduled repeat  section   Pt admits to + fetal movement, denies LOF & VB   Oriented to room

## 2020-12-04 NOTE — ANESTHESIA PROCEDURE NOTES
Spinal Block    Patient location during procedure: OR  Start time: 12/4/2020 9:35 AM  End time: 12/4/2020 9:39 AM  Reason for block: primary anesthetic and at surgeon's request  Staffing  Anesthesiologist: Trey Muñoz MD  Resident/CRNA: ANNIE Mercado - CRNA  Performed: resident/CRNA   Preanesthetic Checklist  Completed: patient identified, site marked, surgical consent, pre-op evaluation, timeout performed, IV checked, risks and benefits discussed, monitors and equipment checked, anesthesia consent given, oxygen available and patient being monitored  Spinal Block  Patient position: sitting  Prep: ChloraPrep  Patient monitoring: cardiac monitor, continuous pulse ox, continuous capnometry and frequent blood pressure checks  Approach: midline  Location: L3/L4  Provider prep: mask, sterile gloves and sterile gown  Local infiltration: lidocaine  Dose: 0.2  Agent: bupivacaine  Adjuvant: duramorph  Dose: 1.6  Dose: 1.6  Needle  Needle type: Pencan   Needle gauge: 25 G  Needle length: 3.5 in  Assessment  Sensory level: T6  Swirl obtained: Yes  CSF: clear  Attempts: 1  Hemodynamics: stable

## 2020-12-04 NOTE — H&P
Department of Obstetrics and Gynecology  Labor and Delivery  History & Physical    Patient:  Emmie Costello Date:  2020  7:29 AM  Medical Record Number:  [id number]    CHIEF COMPLAINT:  IUP at 44 5/7 weeks, previous  section, breech - declines ECV and TOLAC    PROBLEM LIST:     Patient Active Problem List   Diagnosis    Maternal varicella, non-immune    39 5/7 weeks gestation of pregnancy    Multigravida in third trimester    Breech presentation, no version    Previous  delivery affecting pregnancy- Progressed to 8 cm. emergent PLTCS for Fetal bradycardia. History of abscess of C/S incision. C/S 20 at 0930 if not delivered sooner    Patient declines vaginal birth after  section ()    Group B Streptococcus carrier, +RV culture, currently pregnant    Iron deficiency anemia of pregnancy-taking FeSO4 twice daily    Normal pregnancy in third trimester           HISTORY OF PRESENT ILLNESS:    The patient is a 30 y.o. W female  at 38w11d. Patient presents with a chief complaint of prior  section and persistent ellen breech presentation since 36 weeks - patient was initially planning to [address] if baby was vertex and presented withspontaneous labor or a favorable cervix prior to her  section date. Last exam 20 BS=1 and US confirmed ellen breech. Patient denies contractions, LOF, VB. No sx UTI or PIH.   Good FM    ESTIMATED DUE DATE: Estimated Date of Delivery: 20    PRENATAL CARE:  Complicated by: Previously infection  section incision,  GBS: positive    Past OB History  OB History        2    Para   1    Term   1            AB        Living   1       SAB        TAB        Ectopic        Molar        Multiple        Live Births   1                Past Medical History:        Diagnosis Date    Anemia     Chronic kidney disease     Depression     no medications    GERD (gastroesophageal reflux disease)     Heart abnormality     Hiatal hernia     PONV (postoperative nausea and vomiting)     Thyroid disease     Trauma     Ulcer        Past Surgical History:        Procedure Laterality Date     SECTION  2012    ENDOSCOPY, COLON, DIAGNOSTIC  2015    HYSTEROSCOPY  2018    D&C, polypectomy    INCISE AND DRAIN ABCESS  7/3/2012         UPPER GASTROINTESTINAL ENDOSCOPY  2015       Allergies: Iv dye [iodides];  Medrol [methylprednisolone]; and Percocet [oxycodone-acetaminophen]    Social History:    Social History     Socioeconomic History    Marital status:      Spouse name: Not on file    Number of children: Not on file    Years of education: Not on file    Highest education level: Not on file   Occupational History    Not on file   Social Needs    Financial resource strain: Not on file    Food insecurity     Worry: Not on file     Inability: Not on file   [employer] needs     Medical: Not on file     Non-medical: Not on file   Tobacco Use    Smoking status: Never Smoker    Smokeless tobacco: Never Used   Substance and Sexual Activity    Alcohol use: No     Alcohol/week: 0.0 standard drinks    Drug use: No    Sexual activity: Yes     Partners: Male     Birth control/protection: Inserts     Comment: Nuva Ring   Lifestyle    Physical activity     Days per week: Not on file     Minutes per session: Not on file    Stress: Not on file   Relationships    Social connections     Talks on phone: Not on file     Gets together: Not on file     Attends Islam service: Not on file     Active member of club or organization: Not on file     Attends meetings of clubs or organizations: Not on file     Relationship status: Not on file    Intimate partner violence     Fear of current or ex partner: Not on file     Emotionally abused: Not on file     Physically abused: Not on file     Forced sexual activity: Not on file   Other Topics Concern    Not on file   Social History Narrative    Not on file       Family History:       Problem Relation Age of Onset    Heart Attack Father 45        LAD    Diabetes Maternal Grandmother     Hypertension Maternal Grandfather        Medications Prior to Admission:  Medications Prior to Admission: terconazole (TERAZOL 7) 0.4 % vaginal cream, Place vaginally nightly x 7 days  ferrous sulfate (IRON 325) 325 (65 Fe) MG tablet, Take 1 tablet by mouth 2 times daily  famotidine (PEPCID) 20 MG tablet, Take 1 tablet by mouth 2 times daily  calcium carbonate (TUMS) 500 MG chewable tablet, Take 1 tablet by mouth daily  Prenat w/o Y-EX-Xcslbei-FA-DHA (PNV-DHA) 27-0.6-0.4-300 MG CAPS, Take by mouth  CLINDAMYCIN PHOSPHATE,TOPICAL, 1 % SWAB, SWAB FACE ONCE TO TWICE DAILY AFTER GENTLE SOAP AND RINSE.  doxyLAMINE succinate (GNP SLEEP AID) 25 MG tablet, Take 1 tablet by mouth nightly (Patient not taking: Reported on 9/11/2020)  vitamin B-6 (PYRIDOXINE) 25 MG tablet, Take 1 tablet by mouth daily (Patient not taking: Reported on 10/9/2020)    REVIEW OF SYSTEMS:  CONSTITUTIONAL:  negative  RESPIRATORY:  negative  CARDIOVASCULAR:  negative  GASTROINTESTINAL:  negative  ALLERGIC/IMMUNOLOGIC:  negative  NEUROLOGICAL:  negative  BEHAVIOR/PSYCH:  negative    PHYSICAL EXAM:  Vitals:    12/04/20 0814 12/04/20 0818   BP:  124/80   Pulse:  87   Resp:  16   Temp:  98.2 °F (36.8 °C)   TempSrc:  Oral   Weight: 182 lb (82.6 kg)    Height: 5' 3\" (1.6 m)      General appearance:  awake, alert, cooperative, no apparent distress, and appears stated age  Neurologic:  Awake, alert, oriented to name, place and time.   Skin: warm, dry, normal color, no rashes  Head:  NC,AT,NT  Eyes:  Sclerae white, pupils equal and reactive, red reflex normal bilaterally  Ears:  Well-positioned, well-formed pinnae; Hearing: intact  Nose:  Clear, normal mucosa  Throat:  Lips, tongue and mucosa are pink, moist and intact; no exudate  Neck:  Supple, symmetrical  Heart:  Regular rate & rhythm, S1 S2, no murmurs, rubs, or gallops  Lungs:  No increased work of breathing, good air exchange, CTA b/l. Abdomen:  Soft, non tender, gravid, consistent with her gestational age, EFW by Leopald's manouever was 7.5#  Extremities: No clubbing, cyanosis, cords; No calf tenderness; Edema: trace  Pulses:  Strong equal distal pulses, brisk capillary refill  Fetal heart rate:  Reassuring. Pelvis:  Adequate pelvis  Cervix: Closed / Long / medium / -3 / posterior  BS=1  Contraction frequency:  2 minutes-10 minutes, irregular, not felt  Membranes:  Intact    Labs:  Recent Results (from the past 72 hour(s))   URINE DRUG SCREEN    Collection Time: 20  8:30 AM   Result Value Ref Range    Amphetamine Screen, Urine NOT DETECTED Negative <1000 ng/mL    Barbiturate Screen, Ur NOT DETECTED Negative < 200 ng/mL    Benzodiazepine Screen, Urine NOT DETECTED Negative < 200 ng/mL    Cannabinoid Scrn, Ur NOT DETECTED Negative < 50ng/mL    Cocaine Metabolite Screen, Urine NOT DETECTED Negative < 300 ng/mL    Opiate Scrn, Ur NOT DETECTED Negative < 300ng/mL    PCP Screen, Urine NOT DETECTED Negative < 25 ng/mL    Methadone Screen, Urine NOT DETECTED Negative <300 ng/mL    Oxycodone Urine NOT DETECTED Negative <100 ng/mL    FENTANYL SCREEN, URINE NOT DETECTED Negative <1 ng/mL    Drug Screen Comment: see below    CBC    Collection Time: 20  8:30 AM   Result Value Ref Range    WBC 8.1 4.5 - 11.5 E9/L    RBC 3.97 3.50 - 5.50 E12/L    Hemoglobin 9.0 (L) 11.5 - 15.5 g/dL    Hematocrit 30.3 (L) 34.0 - 48.0 %    MCV 76.3 (L) 80.0 - 99.9 fL    MCH 22.7 (L) 26.0 - 35.0 pg    MCHC 29.7 (L) 32.0 - 34.5 %    RDW 17.6 (H) 11.5 - 15.0 fL    Platelets 380 335 - 453 E9/L    MPV 11.0 7.0 - 12.0 fL       ASSESSMENT:  30 y.o.  W female at 38w11d   Previous  section - declines   Unfavorable cervix  Breech presentation by 3020   Patient Active Problem List   Diagnosis    Maternal varicella, non-immune    39 5/7 weeks gestation of pregnancy    Multigravida in third trimester    Breech presentation, no version    Previous  delivery affecting pregnancy- Progressed to 8 cm. emergent PLTCS for Fetal bradycardia. History of abscess of C/S incision. C/S 20 at 0930 if not delivered sooner    Patient declines vaginal birth after  section ()    Group B Streptococcus carrier, +RV culture, currently pregnant    Iron deficiency anemia of pregnancy-taking FeSO4 twice daily    Normal pregnancy in third trimester     Fetus: Reassuring  GBS: positive    PLAN:  Admit per routine  CBC, T&S, UDS  Continuous EFM  Abdominal Prep  Notify anesthesia for Spinal  PO Bicitra  Ancef 2 grams on call to OR  Zheng catheter. Management options were reviewed - patient had been interested in  if spontaneous labor or favorable cervix for IOL by the scheduled  section date - the patient's cervix has been unfavorable and BS=1 today with a -3 station, the risks of surgery were discussed, including, but not limited to that of anesthesia, infection, hemorrhage, transfusion, blood borne disease, bowel/bladder/ureteral/vascular injury, and any corrective surgeries (bladder, ureter, bowel, hysterectomy), uterine or cervical lacerations, injury to the baby, DVT,PE, pain and death. Questions were answered to both the patient's and FOB/family satisfaction and informed consent was obtained to proceed as planned. Presenting part had been breech since 36 weeks - no need for US today as the decision to proceed with the scheduled  delivery was already made and would not change the plan. Denice Matias M.D.  FACOG  2020 9:38 AM

## 2020-12-04 NOTE — ANESTHESIA PRE PROCEDURE
MD Esther        citric acid-sodium citrate (BICITRA) solution 30 mL  30 mL Oral Once Justin Brewer MD        ceFAZolin (ANCEF) 2 g in sterile water 20 mL IV syringe  2 g Intravenous Once Justin Brewer MD        ammonia inhaler                Allergies: Allergies   Allergen Reactions    Iv Dye [Iodides] Other (See Comments)     Shut down kidneys, required hospitalization.  Medrol [Methylprednisolone]      IV caused tingling to arms legs face    Percocet [Oxycodone-Acetaminophen] Nausea Only and Other (See Comments)     Jaundice, fever, chills and hallucinations       Problem List:    Patient Active Problem List   Diagnosis Code    GBS (group B Streptococcus carrier), +RV culture, currently pregnant O99.820    Maternal varicella, non-immune O09.899, Z28.3    Wound infection following  section, postpartum O86.00    Multinodular goiter E04.2    Syncope and collapse R55    33 weeks gestation of pregnancy Z3A.33    Multigravida in third trimester Z34.83    Breech presentation, no version O32. 1XX0    Previous  delivery affecting pregnancy- Progressed to 8 cm. emergent PLTCS for Fetal bradycardia. History of abscess of C/S incision.  C/S 20 at 0930 if not delivered sooner O31.200    Patient desires vaginal birth after  section ()-forms done O31.200    Group B Streptococcus carrier, +RV culture, currently pregnant O99.820    Iron deficiency anemia of pregnancy-taking FeSO4 twice daily O99.019, D50.9    Normal pregnancy in third trimester Z34.93       Past Medical History:        Diagnosis Date    Anemia     Chronic kidney disease     Depression     no medications    GERD (gastroesophageal reflux disease)     Heart abnormality     Hiatal hernia     PONV (postoperative nausea and vomiting)     Thyroid disease     Trauma     Ulcer        Past Surgical History:        Procedure Laterality Date     SECTION  2012    ENDOSCOPY, COLON, DIAGNOSTIC  12/14/2015    HYSTEROSCOPY  05/08/2018    D&C, polypectomy    INCISE AND DRAIN ABCESS  7/3/2012         UPPER GASTROINTESTINAL ENDOSCOPY  9/28/2015       Social History:    Social History     Tobacco Use    Smoking status: Never Smoker    Smokeless tobacco: Never Used   Substance Use Topics    Alcohol use: No     Alcohol/week: 0.0 standard drinks                                Counseling given: Not Answered      Vital Signs (Current):   Vitals:    12/04/20 0814 12/04/20 0818   BP:  124/80   Pulse:  87   Weight: 182 lb (82.6 kg)    Height: 5' 3\" (1.6 m)                                               BP Readings from Last 3 Encounters:   12/04/20 124/80   11/30/20 120/78   11/25/20 110/70       NPO Status: Time of last liquid consumption: 2330                        Time of last solid consumption: 2200                        Date of last liquid consumption: 12/03/20                        Date of last solid food consumption: 12/04/20    BMI:   Wt Readings from Last 3 Encounters:   12/04/20 182 lb (82.6 kg)   11/30/20 187 lb (84.8 kg)   11/25/20 181 lb 12.8 oz (82.5 kg)     Body mass index is 32.24 kg/m². CBC:   Lab Results   Component Value Date    WBC 9.2 11/11/2020    RBC 4.17 11/11/2020    HGB 9.8 11/11/2020    HCT 33.7 11/11/2020    MCV 80.8 11/11/2020    RDW 16.0 11/11/2020     11/11/2020       CMP:   Lab Results   Component Value Date     09/23/2020    K 3.5 09/23/2020     09/23/2020    CO2 23 09/23/2020    BUN 8 09/23/2020    CREATININE 0.5 09/23/2020    GFRAA >60 09/23/2020    LABGLOM >60 09/23/2020    GLUCOSE 95 09/23/2020    PROT 7.0 09/23/2020    CALCIUM 9.2 09/23/2020    BILITOT 1.0 09/23/2020    ALKPHOS 67 09/23/2020    AST 16 09/23/2020    ALT 9 09/23/2020       POC Tests: No results for input(s): POCGLU, POCNA, POCK, POCCL, POCBUN, POCHEMO, POCHCT in the last 72 hours.     Coags:   Lab Results   Component Value Date    PROTIME 12.2 07/02/2012    INR 1.2 2012    APTT 24.9 2012       HCG (If Applicable):   Lab Results   Component Value Date    PREGTESTUR negative 2017        ABGs: No results found for: PHART, PO2ART, SWD9VZR, UIE3VJB, BEART, A7AGCVZE     Type & Screen (If Applicable):  No results found for: LABABO, LABRH    Drug/Infectious Status (If Applicable):  No results found for: HIV, HEPCAB    COVID-19 Screening (If Applicable):   Lab Results   Component Value Date    COVID19 Not Detected 2020         Anesthesia Evaluation  Patient summary reviewed and Nursing notes reviewed   history of anesthetic complications (Wound infection following  section, postpartum): PONV. Airway: Mallampati: III  TM distance: >3 FB   Neck ROM: full  Mouth opening: > = 3 FB Dental:    (+) caps      Pulmonary:Negative Pulmonary ROS breath sounds clear to auscultation      (-) not a current smoker                           Cardiovascular:Negative CV ROS  Exercise tolerance: good (>4 METS),           Rhythm: regular  Rate: normal           Beta Blocker:  Not on Beta Blocker         Neuro/Psych:   (+) psychiatric history:depression/anxiety             GI/Hepatic/Renal:   (+) hiatal hernia, GERD:, renal disease (Resolved): ARF and no interval change,           Endo/Other:    (+) blood dyscrasia: anemia:., .                  ROS comment: Multinodular goiter  Abdominal:           Vascular: negative vascular ROS. Anesthesia Plan      spinal and general     ASA 3     (Pt agrees to spinal with GA as back-up, discussed and explained both. All questions and concerns addressed    Pt requested no NSAIDs. Pt has had toradol in the past--no problems.)        Anesthetic plan and risks discussed with patient and spouse. Use of blood products discussed with patient and spouse whom consented to blood products. Plan discussed with CRNA and attending.               Berta Martinez RN   2020

## 2020-12-05 PROBLEM — O34.219 PATIENT DESIRES VAGINAL BIRTH AFTER CESAREAN SECTION (VBAC): Status: RESOLVED | Noted: 2020-12-05 | Resolved: 2020-12-05

## 2020-12-05 PROBLEM — O34.219 PATIENT DESIRES VAGINAL BIRTH AFTER CESAREAN SECTION (VBAC): Status: ACTIVE | Noted: 2020-12-05

## 2020-12-05 LAB
BASOPHILS ABSOLUTE: 0.04 E9/L (ref 0–0.2)
BASOPHILS RELATIVE PERCENT: 0.4 % (ref 0–2)
EOSINOPHILS ABSOLUTE: 0.09 E9/L (ref 0.05–0.5)
EOSINOPHILS RELATIVE PERCENT: 0.8 % (ref 0–6)
HCT VFR BLD CALC: 28.7 % (ref 34–48)
HEMOGLOBIN: 8.6 G/DL (ref 11.5–15.5)
IMMATURE GRANULOCYTES #: 0.03 E9/L
IMMATURE GRANULOCYTES %: 0.3 % (ref 0–5)
LYMPHOCYTES ABSOLUTE: 1.2 E9/L (ref 1.5–4)
LYMPHOCYTES RELATIVE PERCENT: 10.9 % (ref 20–42)
MCH RBC QN AUTO: 23.1 PG (ref 26–35)
MCHC RBC AUTO-ENTMCNC: 30 % (ref 32–34.5)
MCV RBC AUTO: 76.9 FL (ref 80–99.9)
MONOCYTES ABSOLUTE: 0.72 E9/L (ref 0.1–0.95)
MONOCYTES RELATIVE PERCENT: 6.5 % (ref 2–12)
NEUTROPHILS ABSOLUTE: 8.97 E9/L (ref 1.8–7.3)
NEUTROPHILS RELATIVE PERCENT: 81.1 % (ref 43–80)
PDW BLD-RTO: 18.1 FL (ref 11.5–15)
PLATELET # BLD: 282 E9/L (ref 130–450)
PMV BLD AUTO: 10.9 FL (ref 7–12)
RBC # BLD: 3.73 E12/L (ref 3.5–5.5)
WBC # BLD: 11.1 E9/L (ref 4.5–11.5)

## 2020-12-05 PROCEDURE — 2580000003 HC RX 258: Performed by: OBSTETRICS & GYNECOLOGY

## 2020-12-05 PROCEDURE — 6370000000 HC RX 637 (ALT 250 FOR IP): Performed by: ANESTHESIOLOGY

## 2020-12-05 PROCEDURE — 36415 COLL VENOUS BLD VENIPUNCTURE: CPT

## 2020-12-05 PROCEDURE — 1220000000 HC SEMI PRIVATE OB R&B

## 2020-12-05 PROCEDURE — 6360000002 HC RX W HCPCS: Performed by: ANESTHESIOLOGY

## 2020-12-05 PROCEDURE — 6360000002 HC RX W HCPCS: Performed by: OBSTETRICS & GYNECOLOGY

## 2020-12-05 PROCEDURE — 85025 COMPLETE CBC W/AUTO DIFF WBC: CPT

## 2020-12-05 PROCEDURE — 6370000000 HC RX 637 (ALT 250 FOR IP): Performed by: OBSTETRICS & GYNECOLOGY

## 2020-12-05 RX ADMIN — CEFAZOLIN 1 G: 1 INJECTION, POWDER, FOR SOLUTION INTRAMUSCULAR; INTRAVENOUS at 03:04

## 2020-12-05 RX ADMIN — DOCUSATE SODIUM 100 MG: 100 CAPSULE ORAL at 20:35

## 2020-12-05 RX ADMIN — SODIUM CHLORIDE, PRESERVATIVE FREE 10 ML: 5 INJECTION INTRAVENOUS at 09:30

## 2020-12-05 RX ADMIN — Medication: at 09:24

## 2020-12-05 RX ADMIN — FAMOTIDINE 20 MG: 20 TABLET, FILM COATED ORAL at 20:34

## 2020-12-05 RX ADMIN — OXYCODONE HYDROCHLORIDE AND ACETAMINOPHEN 2 TABLET: 5; 325 TABLET ORAL at 11:22

## 2020-12-05 RX ADMIN — OXYCODONE HYDROCHLORIDE AND ACETAMINOPHEN 2 TABLET: 5; 325 TABLET ORAL at 17:58

## 2020-12-05 RX ADMIN — FAMOTIDINE 20 MG: 20 TABLET, FILM COATED ORAL at 09:24

## 2020-12-05 RX ADMIN — FERROUS SULFATE TAB 325 MG (65 MG ELEMENTAL FE) 325 MG: 325 (65 FE) TAB at 18:17

## 2020-12-05 RX ADMIN — KETOROLAC TROMETHAMINE 15 MG: 30 INJECTION, SOLUTION INTRAMUSCULAR at 09:25

## 2020-12-05 RX ADMIN — SIMETHICONE 80 MG: 80 TABLET, CHEWABLE ORAL at 09:24

## 2020-12-05 RX ADMIN — FERROUS SULFATE TAB 325 MG (65 MG ELEMENTAL FE) 325 MG: 325 (65 FE) TAB at 09:24

## 2020-12-05 RX ADMIN — DOCUSATE SODIUM 100 MG: 100 CAPSULE ORAL at 09:24

## 2020-12-05 RX ADMIN — SODIUM CHLORIDE, PRESERVATIVE FREE 10 ML: 5 INJECTION INTRAVENOUS at 03:07

## 2020-12-05 RX ADMIN — SODIUM CHLORIDE, PRESERVATIVE FREE 10 ML: 5 INJECTION INTRAVENOUS at 20:35

## 2020-12-05 RX ADMIN — OXYCODONE HYDROCHLORIDE 5 MG: 5 TABLET ORAL at 04:45

## 2020-12-05 RX ADMIN — OXYCODONE HYDROCHLORIDE AND ACETAMINOPHEN 2 TABLET: 5; 325 TABLET ORAL at 23:14

## 2020-12-05 RX ADMIN — METFORMIN HYDROCHLORIDE 1 TABLET: 500 TABLET, EXTENDED RELEASE ORAL at 09:24

## 2020-12-05 RX ADMIN — SIMETHICONE 80 MG: 80 TABLET, CHEWABLE ORAL at 18:18

## 2020-12-05 RX ADMIN — IBUPROFEN 600 MG: 600 TABLET, FILM COATED ORAL at 21:59

## 2020-12-05 RX ADMIN — KETOROLAC TROMETHAMINE 15 MG: 30 INJECTION, SOLUTION INTRAMUSCULAR at 03:08

## 2020-12-05 RX ADMIN — IBUPROFEN 600 MG: 600 TABLET, FILM COATED ORAL at 15:46

## 2020-12-05 ASSESSMENT — PAIN SCALES - GENERAL
PAINLEVEL_OUTOF10: 4
PAINLEVEL_OUTOF10: 7
PAINLEVEL_OUTOF10: 4
PAINLEVEL_OUTOF10: 4
PAINLEVEL_OUTOF10: 7

## 2020-12-05 NOTE — PROGRESS NOTES
Report rec' d from   [facility]; assumed care of pt; pt resting in bed bonding w/ baby; taking light general diet w/o nausea; will accept ordered roxicodone;  at bedside.

## 2020-12-05 NOTE — FLOWSHEET NOTE
Zheng catheter discontinued. Drained 1400cc clear yellow urine. Pt DTV [id number]. Pt dangled at bedside x 5 min then assisted to bathroom for homar care. Pt instructed on homar care. Lochia RNA, one small clot, broke up easily. Pt back to bed. PCD's on. Pt to call for assist to bathroom with next void.

## 2020-12-05 NOTE — LACTATION NOTE
Mom reports baby has been nursing well so far, circ'd this am. Reviewed normal infant behavior after circ and encouraged hand expression. Support provided and encouraged to call with any needs.

## 2020-12-05 NOTE — ANESTHESIA POSTPROCEDURE EVALUATION
Department of Anesthesiology  Postprocedure Note    Patient: Meri Arrington  MRN: [id number]  YOB: 1995  Date of evaluation: 2020  Time:  10:05 AM     Procedure Summary     Date:  20 Room / Location:  [facility] OR 01 / [facility]    Anesthesia Start:  0243 Anesthesia Stop:  7310    Procedure:   SECTION (N/A Uterus) Diagnosis:  ( - ARMAND [id number])    Surgeon:  Daryl Coffey MD Responsible Provider:  Oscar Sylvester MD    Anesthesia Type:  spinal, general ASA Status:  3          Anesthesia Type: spinal, general    Hussain Phase I: Hussain Score: 9    Hussain Phase II:      Last vitals: Reviewed and per EMR flowsheets.        Anesthesia Post Evaluation    Patient location during evaluation: bedside  Patient participation: complete - patient participated  Level of consciousness: awake and alert  Pain score: 0  Airway patency: patent  Nausea & Vomiting: no nausea and no vomiting  Complications: no  Cardiovascular status: blood pressure returned to baseline  Respiratory status: acceptable  Hydration status: euvolemic

## 2020-12-05 NOTE — PROGRESS NOTES
POD #1    Patient:  James Gandhi Date:  12/4/2020  7:29 AM  Medical Record Number:  [id number]   Today's Date: 12/5/2020    S: No complaints; tolerating diet: yes - general; ambulating well: yes - up in room to void, up in chair; voiding without difficulty:  yes - no urinary comlaints; bm: denies; flatus: yes - a small amount this morning; pain meds appropriate: yes - IV Toradol to supplement her Duramorph spinal; vaginal bleeding: staining only, thin lochia.     O:   Vitals:    12/05/20 0321 12/05/20 0400 12/05/20 0600 12/05/20 0737   BP: 97/64   111/60   Pulse: 88 89 99 92   Resp: 16 18 16 18   Temp: 98.6 °F (37 °C)   98.8 °F (37.1 °C)   TempSrc: Oral   Oral   SpO2:  96% 96%    Weight:       Height:         Gen: A&O, cooperative, pleasant   Heart: RRR   Lungs: CTA b/l   Abd; soft, NT, non distended, +BS  Back: no CVA or paraspinal tenderness   Ext: No clubbing, cyanosis, edema:trace , no cords palpable, no calf tenderness   Neuro: intact   Inc: clean, dry, intact with glue  Uterus: firm, well contracted, nt   Augustina pad: staining only, thin lochia    Recent Results (from the past 72 hour(s))   URINE DRUG SCREEN    Collection Time: 12/04/20  8:30 AM   Result Value Ref Range    Amphetamine Screen, Urine NOT DETECTED Negative <1000 ng/mL    Barbiturate Screen, Ur NOT DETECTED Negative < 200 ng/mL    Benzodiazepine Screen, Urine NOT DETECTED Negative < 200 ng/mL    Cannabinoid Scrn, Ur NOT DETECTED Negative < 50ng/mL    Cocaine Metabolite Screen, Urine NOT DETECTED Negative < 300 ng/mL    Opiate Scrn, Ur NOT DETECTED Negative < 300ng/mL    PCP Screen, Urine NOT DETECTED Negative < 25 ng/mL    Methadone Screen, Urine NOT DETECTED Negative <300 ng/mL    Oxycodone Urine NOT DETECTED Negative <100 ng/mL    FENTANYL SCREEN, URINE NOT DETECTED Negative <1 ng/mL    Drug Screen Comment: see below    CBC    Collection Time: 12/04/20  8:30 AM   Result Value Ref Range    WBC 8.1 4.5 - 11.5 E9/L    RBC 3.97 3.50 - 5.50 E12/L    Hemoglobin 9.0 (L) 11.5 - 15.5 g/dL    Hematocrit 30.3 (L) 34.0 - 48.0 %    MCV 76.3 (L) 80.0 - 99.9 fL    MCH 22.7 (L) 26.0 - 35.0 pg    MCHC 29.7 (L) 32.0 - 34.5 %    RDW 17.6 (H) 11.5 - 15.0 fL    Platelets 783 001 - 518 E9/L    MPV 11.0 7.0 - 12.0 fL   TYPE AND SCREEN    Collection Time: 12/04/20  8:30 AM   Result Value Ref Range    ABO/Rh A POS     Antibody Screen NEG    Comprehensive metabolic panel    Collection Time: 12/04/20 11:56 AM   Result Value Ref Range    Sodium 132 132 - 146 mmol/L    Potassium 4.0 3.5 - 5.0 mmol/L    Chloride 101 98 - 107 mmol/L    CO2 22 22 - 29 mmol/L    Anion Gap 9 7 - 16 mmol/L    Glucose 76 74 - 99 mg/dL    BUN 9 6 - 20 mg/dL    CREATININE 0.6 0.5 - 1.0 mg/dL    GFR Non-African American >60 >=60 mL/min/1.73    GFR African American >60     Calcium 8.5 (L) 8.6 - 10.2 mg/dL    Total Protein 5.6 (L) 6.4 - 8.3 g/dL    Alb 2.9 (L) 3.5 - 5.2 g/dL    Total Bilirubin 0.6 0.0 - 1.2 mg/dL    Alkaline Phosphatase 88 35 - 104 U/L    ALT 6 0 - 32 U/L    AST 16 0 - 31 U/L   CBC auto differential    Collection Time: 12/05/20  4:40 AM   Result Value Ref Range    WBC 11.1 4.5 - 11.5 E9/L    RBC 3.73 3.50 - 5.50 E12/L    Hemoglobin 8.6 (L) 11.5 - 15.5 g/dL    Hematocrit 28.7 (L) 34.0 - 48.0 %    MCV 76.9 (L) 80.0 - 99.9 fL    MCH 23.1 (L) 26.0 - 35.0 pg    MCHC 30.0 (L) 32.0 - 34.5 %    RDW 18.1 (H) 11.5 - 15.0 fL    Platelets 889 688 - 452 E9/L    MPV 10.9 7.0 - 12.0 fL    Neutrophils % 81.1 (H) 43.0 - 80.0 %    Immature Granulocytes % 0.3 0.0 - 5.0 %    Lymphocytes % 10.9 (L) 20.0 - 42.0 %    Monocytes % 6.5 2.0 - 12.0 %    Eosinophils % 0.8 0.0 - 6.0 %    Basophils % 0.4 0.0 - 2.0 %    Neutrophils Absolute 8.97 (H) 1.80 - 7.30 E9/L    Immature Granulocytes # 0.03 E9/L    Lymphocytes Absolute 1.20 (L) 1.50 - 4.00 E9/L    Monocytes Absolute 0.72 0.10 - 0.95 E9/L    Eosinophils Absolute 0.09 0.05 - 0.50 E9/L    Basophils Absolute 0.04 0.00 - 0.20 E9/L       A: 30 y.o. female [id number] at 39w5d  POD#1 S/P repeat low transverse  section   Acute post op anemia superimposed upon chronic iron deficiency anemia of pregnancy. Patient Active Problem List   Diagnosis    Maternal varicella, non-immune    44 5/7 weeks gestation of pregnancy    Multigravida in third trimester    Previous  delivery affecting pregnancy- Progressed to 8 cm. emergent PLTCS for Fetal bradycardia. History of abscess of C/S incision. C/S 20 at 0930 if not delivered sooner    Patient declines vaginal birth after  section ()    Group B Streptococcus carrier, +RV culture, currently pregnant    Iron deficiency anemia of pregnancy-taking FeSO4 twice daily    Normal pregnancy in third trimester     delivery, delivered, current hospitalization    Term birth of male    Marian Rogers Vertex presentation of fetus in third trimester    Nuchal cord without compression, delivered, current hospitalization    Postoperative anemia due to acute blood loss       P: Routine post-op care. D/C IV, IVF's, IV meds and Zheng - in progress. Advance diet and activity as tolerated - in progress. Resume Prenatal Vitamins and iron  Initiate PO pain meds Ibuprofen and Percocet.     Stewart Álvarez MD FACOG  2020 11:16 AM

## 2020-12-05 NOTE — FLOWSHEET NOTE
Pt assisted to bathroom voided. Augustina care done. Lochia RNA , no clots. Pt back to bed. PCD's off , pt ambulatory and moves legs in bed.

## 2020-12-05 NOTE — PLAN OF CARE
Problem: Anxiety:  Goal: Level of anxiety will decrease  Description: Level of anxiety will decrease  Outcome: Completed     Problem: Tissue Perfusion - Uteroplacental, Altered:  Goal: Absence of abnormal fetal heart rate pattern  Description: Absence of abnormal fetal heart rate pattern  Outcome: Completed     Problem: Venous Thromboembolism - Risk of:  Goal: Will show no signs or symptoms of venous thromboembolism  Description: Will show no signs or symptoms of venous thromboembolism  Outcome: Completed     Problem: Fluid Volume - Imbalance:  Goal: Absence of postpartum hemorrhage signs and symptoms  Description: Absence of postpartum hemorrhage signs and symptoms  Outcome: Met This Shift     Problem: Infection - Surgical Site:  Goal: Will show no infection signs and symptoms  Description: Will show no infection signs and symptoms  Outcome: Met This Shift     Problem: Mood - Altered:  Goal: Mood stable  Description: Mood stable  Outcome: Met This Shift     Problem: Pain - Acute:  Goal: Pain level will decrease  Description: Pain level will decrease  Outcome: Ongoing     Problem: Urinary Retention:  Goal: Urinary elimination within specified parameters  Description: Urinary elimination within specified parameters  Outcome: Met This Shift     Problem: Venous Thromboembolism:  Goal: Will show no signs or symptoms of venous thromboembolism  Description: Will show no signs or symptoms of venous thromboembolism  Outcome: Met This Shift

## 2020-12-05 NOTE — PROGRESS NOTES
Hearing screening results were discussed with parent. Questions answered. Brochure given to parent. Advised to monitor developmental milestones and contact physician for any concerns.    Lalo Teresa

## 2020-12-06 VITALS
BODY MASS INDEX: 32.25 KG/M2 | WEIGHT: 182 LBS | SYSTOLIC BLOOD PRESSURE: 116 MMHG | RESPIRATION RATE: 18 BRPM | OXYGEN SATURATION: 96 % | HEART RATE: 81 BPM | TEMPERATURE: 98.6 F | HEIGHT: 63 IN | DIASTOLIC BLOOD PRESSURE: 70 MMHG

## 2020-12-06 PROBLEM — Z34.93 VERTEX PRESENTATION OF FETUS IN THIRD TRIMESTER: Status: ACTIVE | Noted: 2020-12-04

## 2020-12-06 PROBLEM — D62 POSTOPERATIVE ANEMIA DUE TO ACUTE BLOOD LOSS: Status: ACTIVE | Noted: 2020-12-05

## 2020-12-06 PROCEDURE — 6370000000 HC RX 637 (ALT 250 FOR IP): Performed by: OBSTETRICS & GYNECOLOGY

## 2020-12-06 RX ORDER — OXYCODONE HYDROCHLORIDE AND ACETAMINOPHEN 5; 325 MG/1; MG/1
1 TABLET ORAL EVERY 6 HOURS PRN
Qty: 20 TABLET | Refills: 0 | Status: SHIPPED | OUTPATIENT
Start: 2020-12-06 | End: 2020-12-11

## 2020-12-06 RX ORDER — MODIFIED LANOLIN
1 OINTMENT (GRAM) TOPICAL
COMMUNITY
Start: 2020-12-06 | End: 2021-01-20

## 2020-12-06 RX ORDER — IBUPROFEN 600 MG/1
600 TABLET ORAL EVERY 6 HOURS PRN
Qty: 30 TABLET | Refills: 0 | Status: SHIPPED | OUTPATIENT
Start: 2020-12-06 | End: 2021-01-20

## 2020-12-06 RX ORDER — PSEUDOEPHEDRINE HCL 30 MG
100 TABLET ORAL 2 TIMES DAILY PRN
COMMUNITY
Start: 2020-12-06 | End: 2021-01-20

## 2020-12-06 RX ORDER — SIMETHICONE 80 MG
80 TABLET,CHEWABLE ORAL EVERY 6 HOURS PRN
Refills: 0 | COMMUNITY
Start: 2020-12-06 | End: 2021-01-20

## 2020-12-06 RX ADMIN — IBUPROFEN 600 MG: 600 TABLET, FILM COATED ORAL at 08:26

## 2020-12-06 RX ADMIN — OXYCODONE HYDROCHLORIDE AND ACETAMINOPHEN 2 TABLET: 5; 325 TABLET ORAL at 09:15

## 2020-12-06 RX ADMIN — FAMOTIDINE 20 MG: 20 TABLET, FILM COATED ORAL at 08:26

## 2020-12-06 RX ADMIN — OXYCODONE HYDROCHLORIDE AND ACETAMINOPHEN 2 TABLET: 5; 325 TABLET ORAL at 03:50

## 2020-12-06 RX ADMIN — SIMETHICONE 80 MG: 80 TABLET, CHEWABLE ORAL at 09:15

## 2020-12-06 RX ADMIN — FERROUS SULFATE TAB 325 MG (65 MG ELEMENTAL FE) 325 MG: 325 (65 FE) TAB at 08:26

## 2020-12-06 RX ADMIN — DOCUSATE SODIUM 100 MG: 100 CAPSULE ORAL at 08:26

## 2020-12-06 RX ADMIN — OXYCODONE HYDROCHLORIDE AND ACETAMINOPHEN 1 TABLET: 5; 325 TABLET ORAL at 13:45

## 2020-12-06 RX ADMIN — METFORMIN HYDROCHLORIDE 1 TABLET: 500 TABLET, EXTENDED RELEASE ORAL at 08:26

## 2020-12-06 ASSESSMENT — PAIN DESCRIPTION - FREQUENCY
FREQUENCY: INTERMITTENT
FREQUENCY: INTERMITTENT

## 2020-12-06 ASSESSMENT — PAIN DESCRIPTION - ONSET
ONSET: GRADUAL
ONSET: GRADUAL

## 2020-12-06 ASSESSMENT — PAIN DESCRIPTION - LOCATION
LOCATION: ABDOMEN;INCISION
LOCATION: ABDOMEN;INCISION

## 2020-12-06 ASSESSMENT — PAIN DESCRIPTION - PROGRESSION
CLINICAL_PROGRESSION: GRADUALLY WORSENING
CLINICAL_PROGRESSION: GRADUALLY WORSENING

## 2020-12-06 ASSESSMENT — PAIN DESCRIPTION - ORIENTATION
ORIENTATION: LOWER;MID
ORIENTATION: LOWER;MID

## 2020-12-06 ASSESSMENT — PAIN DESCRIPTION - DESCRIPTORS
DESCRIPTORS: DISCOMFORT;SORE;TENDER
DESCRIPTORS: ACHING;DISCOMFORT;SORE

## 2020-12-06 ASSESSMENT — PAIN SCALES - GENERAL
PAINLEVEL_OUTOF10: 7
PAINLEVEL_OUTOF10: 4
PAINLEVEL_OUTOF10: 7
PAINLEVEL_OUTOF10: 3

## 2020-12-06 ASSESSMENT — PAIN DESCRIPTION - PAIN TYPE
TYPE: ACUTE PAIN;SURGICAL PAIN
TYPE: ACUTE PAIN;SURGICAL PAIN

## 2020-12-06 ASSESSMENT — PAIN - FUNCTIONAL ASSESSMENT
PAIN_FUNCTIONAL_ASSESSMENT: ACTIVITIES ARE NOT PREVENTED
PAIN_FUNCTIONAL_ASSESSMENT: ACTIVITIES ARE NOT PREVENTED

## 2020-12-06 NOTE — PLAN OF CARE
Problem: Fluid Volume - Imbalance:  Goal: Absence of postpartum hemorrhage signs and symptoms  Description: Absence of postpartum hemorrhage signs and symptoms  Outcome: Met This Shift     Problem: Infection - Surgical Site:  Goal: Will show no infection signs and symptoms  Description: Will show no infection signs and symptoms  Outcome: Met This Shift     Problem: Mood - Altered:  Goal: Mood stable  Description: Mood stable  Outcome: Met This Shift     Problem: Nausea/Vomiting:  Goal: Absence of nausea/vomiting  Description: Absence of nausea/vomiting  Outcome: Met This Shift

## 2020-12-06 NOTE — LACTATION NOTE
Mom reports baby is nursing well, no concerns. Encouraged frequent feeds to establish milk supply. Reviewed benefits and safety of skin to skin. Inst on adequate I/O and importance of keeping track of diapers at home. Instructed on signs of dehydration such as infant refusing to feed, decreased wet diapers and infant becoming listless and notify provider if these occur. Reviewed with mom the importance of notifying the physician if baby looks more jaundiced. Lactation office # given if follow-up needed, as well as other helpful resources. Encouraged to call with any concerns. Support and encouragement given.

## 2020-12-06 NOTE — PROGRESS NOTES
POD #2    Patient:  Kirit Valera Date:  12/4/2020  7:29 AM  Medical Record Number:  [id number]   Today's Date: 12/6/2020    S: No complaints -feeling much better today. Would like a day #2 discharge; tolerating diet: yes -general; ambulating well: yes -up in room and in halls; voiding without difficulty:  yes -no urinary complaints; bm: yes -normal; flatus: yes - normal; pain meds appropriate: yes - Ibuprofen and Percocet; vaginal bleeding: staining only, light.     O:   Vitals:    12/05/20 0800 12/05/20 1000 12/05/20 1940 12/06/20 0802   BP:   111/73 116/70   Pulse: 96 95 72 81   Resp: 16 16 16 18   Temp:   97.8 °F (36.6 °C) 98.6 °F (37 °C)   TempSrc:   Oral Oral   SpO2: 95% 96%     Weight:       Height:         Gen: A&O, cooperative, pleasant   Heart: RRR   Lungs: CTA b/l   Abd; soft, NT, non distended, +BS  Back: no CVA or paraspinal tenderness   Ext: No clubbing, cyanosis, edema:1+ , no cords palpable, no calf tenderness   Neuro: intact   Inc: clean, dry, intact with Dermabond  Uterus: firm, well contracted, nt   Augustina pad: staining only, thin lochia    Recent Results (from the past 72 hour(s))   URINE DRUG SCREEN    Collection Time: 12/04/20  8:30 AM   Result Value Ref Range    Amphetamine Screen, Urine NOT DETECTED Negative <1000 ng/mL    Barbiturate Screen, Ur NOT DETECTED Negative < 200 ng/mL    Benzodiazepine Screen, Urine NOT DETECTED Negative < 200 ng/mL    Cannabinoid Scrn, Ur NOT DETECTED Negative < 50ng/mL    Cocaine Metabolite Screen, Urine NOT DETECTED Negative < 300 ng/mL    Opiate Scrn, Ur NOT DETECTED Negative < 300ng/mL    PCP Screen, Urine NOT DETECTED Negative < 25 ng/mL    Methadone Screen, Urine NOT DETECTED Negative <300 ng/mL    Oxycodone Urine NOT DETECTED Negative <100 ng/mL    FENTANYL SCREEN, URINE NOT DETECTED Negative <1 ng/mL    Drug Screen Comment: see below    CBC    Collection Time: 12/04/20  8:30 AM   Result Value Ref Range    WBC 8.1 4.5 - 11.5 E9/L    RBC 3.97 3.50 - 5.50 E12/L    Hemoglobin 9.0 (L) 11.5 - 15.5 g/dL    Hematocrit 30.3 (L) 34.0 - 48.0 %    MCV 76.3 (L) 80.0 - 99.9 fL    MCH 22.7 (L) 26.0 - 35.0 pg    MCHC 29.7 (L) 32.0 - 34.5 %    RDW 17.6 (H) 11.5 - 15.0 fL    Platelets 187 008 - 574 E9/L    MPV 11.0 7.0 - 12.0 fL   TYPE AND SCREEN    Collection Time: 12/04/20  8:30 AM   Result Value Ref Range    ABO/Rh A POS     Antibody Screen NEG    Comprehensive metabolic panel    Collection Time: 12/04/20 11:56 AM   Result Value Ref Range    Sodium 132 132 - 146 mmol/L    Potassium 4.0 3.5 - 5.0 mmol/L    Chloride 101 98 - 107 mmol/L    CO2 22 22 - 29 mmol/L    Anion Gap 9 7 - 16 mmol/L    Glucose 76 74 - 99 mg/dL    BUN 9 6 - 20 mg/dL    CREATININE 0.6 0.5 - 1.0 mg/dL    GFR Non-African American >60 >=60 mL/min/1.73    GFR African American >60     Calcium 8.5 (L) 8.6 - 10.2 mg/dL    Total Protein 5.6 (L) 6.4 - 8.3 g/dL    Alb 2.9 (L) 3.5 - 5.2 g/dL    Total Bilirubin 0.6 0.0 - 1.2 mg/dL    Alkaline Phosphatase 88 35 - 104 U/L    ALT 6 0 - 32 U/L    AST 16 0 - 31 U/L   CBC auto differential    Collection Time: 12/05/20  4:40 AM   Result Value Ref Range    WBC 11.1 4.5 - 11.5 E9/L    RBC 3.73 3.50 - 5.50 E12/L    Hemoglobin 8.6 (L) 11.5 - 15.5 g/dL    Hematocrit 28.7 (L) 34.0 - 48.0 %    MCV 76.9 (L) 80.0 - 99.9 fL    MCH 23.1 (L) 26.0 - 35.0 pg    MCHC 30.0 (L) 32.0 - 34.5 %    RDW 18.1 (H) 11.5 - 15.0 fL    Platelets 100 184 - 337 E9/L    MPV 10.9 7.0 - 12.0 fL    Neutrophils % 81.1 (H) 43.0 - 80.0 %    Immature Granulocytes % 0.3 0.0 - 5.0 %    Lymphocytes % 10.9 (L) 20.0 - 42.0 %    Monocytes % 6.5 2.0 - 12.0 %    Eosinophils % 0.8 0.0 - 6.0 %    Basophils % 0.4 0.0 - 2.0 %    Neutrophils Absolute 8.97 (H) 1.80 - 7.30 E9/L    Immature Granulocytes # 0.03 E9/L    Lymphocytes Absolute 1.20 (L) 1.50 - 4.00 E9/L    Monocytes Absolute 0.72 0.10 - 0.95 E9/L    Eosinophils Absolute 0.09 0.05 - 0.50 E9/L    Basophils Absolute 0.04 0.00 - 0.20 E9/L       A: 30 y.o. female [id number] at 39w5d  POD#2 S/P repeat low transverse  section requesting an early  Day #2 discharge  Patient Active Problem List   Diagnosis    Maternal varicella, non-immune    39 5/7 weeks gestation of pregnancy    Multigravida in third trimester    Previous  delivery affecting pregnancy- Progressed to 8 cm. emergent PLTCS for Fetal bradycardia. History of abscess of C/S incision. C/S 20 at 0930 if not delivered sooner    Patient declines vaginal birth after  section ()    Group B Streptococcus carrier, +RV culture, currently pregnant    Iron deficiency anemia of pregnancy-taking FeSO4 twice daily    Normal pregnancy in third trimester     delivery, delivered, current hospitalization    Term birth of male    [facility] Vertex presentation of fetus in third trimester    Nuchal cord without compression, delivered, current hospitalization    Postoperative anemia due to acute blood loss       P: Routine post-op care. Discharge home with instructions, precautions. Prescriptions for Percocet and Ibuprofen 600 mg. May continue over-the-counter Simethicone and Colace PRN. Continue PNV with Iron  Follow-up office 1 week for incision check, and 6-8 weeks for final post-op visit.     Felipe Giron MD FACOG  2020 2:25 PM

## 2020-12-06 NOTE — DISCHARGE SUMMARY
Department of Obstetrics and Gynecology  Labor and Delivery  Discharge Summary    Patient:  Giovanni Blanc         Medical Record Number:  [id number]    Admit Date:  2020  7:29 AM    Discharge Date: 2020    Final Diagnosis:   Active Problems:    44 5/7 weeks gestation of pregnancy    Previous  delivery affecting pregnancy- Progressed to 8 cm. emergent PLTCS for Fetal bradycardia. History of abscess of C/S incision. C/S 20 at 0930 if not delivered sooner    Multigravida in third trimester    Patient declines vaginal birth after  section ()    Group B Streptococcus carrier, +RV culture, currently pregnant     delivery, delivered, current hospitalization    Term birth of male     Nuchal cord without compression, delivered, current hospitalization    Iron deficiency anemia of pregnancy-taking FeSO4 twice daily    Normal pregnancy in third trimester    Vertex presentation of fetus in third trimester    Postoperative anemia due to acute blood loss  Resolved Problems:    Breech presentation, no version      Chief Complaint - Presenting Illness - Physical Findings:   Normal pregnancy in third trimester [Z34.93]  HPI: Indication For Procedure: The patient is a 27 y.o. W female  at [address] [place] [address] presents with a chief complaint of prior  section and persistent ellen breech presentation since 36 weeks - patient was initially planning to [address] [facility] if baby was vertex and presented withspontaneous labor or a favorable cervix prior to her  section date. Last exam 20 BS=1 and US confirmed ellen breech. Patient denies contractions, LOF, VB.  No sx UTI or PIH.  Good FM.  Management options were reviewed - patient had been interested in  if spontaneous labor or favorable cervix for IOL by the scheduled  section date - the patient's cervix has been unfavorable and BS=1 today with a -3 station, the risks of surgery were discussed, including, but not section ()    Group B Streptococcus carrier, +RV culture, currently pregnant    Iron deficiency anemia of pregnancy-taking FeSO4 twice daily    Normal pregnancy in third trimester     delivery, delivered, current hospitalization    Term birth of male    .[facility] Vertex presentation of fetus in third trimester    Nuchal cord without compression, delivered, current hospitalization      Procedure(s): Repeat Low Transverse  Section via Pfannenstiel skin incision     Surgeon(s): Venecia Wu MD     Assistant:  Physician Assistant: Junie Bence, PA-C     Anesthesia: Spinal     Complications: None     Estimated Blood Loss (mL): 500     Crystalloid Replaced (mL): 1700     Urine Output (mL): 200, clear     Drains:   Urethral Catheter Non-latex 16 fr (Active)      Intraoperative Medication: IV Ancef 2 g on-call to the OR, IV Pitocin drip after delivery of placenta.     Findings:  Live Born 20 at 1  Sex: Male  Fetal Position:  Cephalic, left occiput anterior  Apgars:  1 minute:  9; 5 minute:  9  Weight:  7# 13oz, 3530 grams  Nuchal Cord: was present and reduced  Placenta: was delivered with gentle traction and was noted to be intact, whole and that the umbilical cord had three vessels noted. Tubes, uterus, ovaries:  Normal     Specimens:   * No specimens in log *     Implants:  * No implants in log *      Condition:    Infant stable, transfer to postanesthesia recovery then to [place] [address] Nursery  Mother stable, transfer to post anesthesia recovery then to Maternity     The patient was transferred to the recovery room with her IV, Zheng, and SCD's from OR in place, where she was given IV Toradol  to supplement her Duramorph Spinal for pain management. Her IV antibiotics were continued for 24 hour coverage. On the first postoperative day her IV, IVF, IV medications, SCD's and Zheng were discontinued. She was started on PO pain meds (Percocet and Motrin 600 mg).  She was encouraged to advance her diet and activities as tolerated. The patient had an unremarkable Hospital Course. Her care was advanced per routine protocol. Her vital signs were stable, she remained afebrile, and her physical exam was unremarkable throughout her hospitalization. She was subsequently discharged home on the second Hospital Day as she was tolerating her diet, ambulating well, voiding without difficulty and had appropriate flatus with a bowel movement.     Recent Results (from the past 72 hour(s))   URINE DRUG SCREEN    Collection Time: 12/04/20  8:30 AM   Result Value Ref Range    Amphetamine Screen, Urine NOT DETECTED Negative <1000 ng/mL    Barbiturate Screen, Ur NOT DETECTED Negative < 200 ng/mL    Benzodiazepine Screen, Urine NOT DETECTED Negative < 200 ng/mL    Cannabinoid Scrn, Ur NOT DETECTED Negative < 50ng/mL    Cocaine Metabolite Screen, Urine NOT DETECTED Negative < 300 ng/mL    Opiate Scrn, Ur NOT DETECTED Negative < 300ng/mL    PCP Screen, Urine NOT DETECTED Negative < 25 ng/mL    Methadone Screen, Urine NOT DETECTED Negative <300 ng/mL    Oxycodone Urine NOT DETECTED Negative <100 ng/mL    FENTANYL SCREEN, URINE NOT DETECTED Negative <1 ng/mL    Drug Screen Comment: see below    CBC    Collection Time: 12/04/20  8:30 AM   Result Value Ref Range    WBC 8.1 4.5 - 11.5 E9/L    RBC 3.97 3.50 - 5.50 E12/L    Hemoglobin 9.0 (L) 11.5 - 15.5 g/dL    Hematocrit 30.3 (L) 34.0 - 48.0 %    MCV 76.3 (L) 80.0 - 99.9 fL    MCH 22.7 (L) 26.0 - 35.0 pg    MCHC 29.7 (L) 32.0 - 34.5 %    RDW 17.6 (H) 11.5 - 15.0 fL    Platelets 659 398 - 797 E9/L    MPV 11.0 7.0 - 12.0 fL   TYPE AND SCREEN    Collection Time: 12/04/20  8:30 AM   Result Value Ref Range    ABO/Rh A POS     Antibody Screen NEG    Comprehensive metabolic panel    Collection Time: 12/04/20 11:56 AM   Result Value Ref Range    Sodium 132 132 - 146 mmol/L    Potassium 4.0 3.5 - 5.0 mmol/L    Chloride 101 98 - 107 mmol/L    CO2 22 22 - 29 mmol/L Anion Gap 9 7 - 16 mmol/L    Glucose 76 74 - 99 mg/dL    BUN 9 6 - 20 mg/dL    CREATININE 0.6 0.5 - 1.0 mg/dL    GFR Non-African American >60 >=60 mL/min/1.73    GFR African American >60     Calcium 8.5 (L) 8.6 - 10.2 mg/dL    Total Protein 5.6 (L) 6.4 - 8.3 g/dL    Alb 2.9 (L) 3.5 - 5.2 g/dL    Total Bilirubin 0.6 0.0 - 1.2 mg/dL    Alkaline Phosphatase 88 35 - 104 U/L    ALT 6 0 - 32 U/L    AST 16 0 - 31 U/L   CBC auto differential    Collection Time: 12/05/20  4:40 AM   Result Value Ref Range    WBC 11.1 4.5 - 11.5 E9/L    RBC 3.73 3.50 - 5.50 E12/L    Hemoglobin 8.6 (L) 11.5 - 15.5 g/dL    Hematocrit 28.7 (L) 34.0 - 48.0 %    MCV 76.9 (L) 80.0 - 99.9 fL    MCH 23.1 (L) 26.0 - 35.0 pg    MCHC 30.0 (L) 32.0 - 34.5 %    RDW 18.1 (H) 11.5 - 15.0 fL    Platelets 132 049 - 315 E9/L    MPV 10.9 7.0 - 12.0 fL    Neutrophils % 81.1 (H) 43.0 - 80.0 %    Immature Granulocytes % 0.3 0.0 - 5.0 %    Lymphocytes % 10.9 (L) 20.0 - 42.0 %    Monocytes % 6.5 2.0 - 12.0 %    Eosinophils % 0.8 0.0 - 6.0 %    Basophils % 0.4 0.0 - 2.0 %    Neutrophils Absolute 8.97 (H) 1.80 - 7.30 E9/L    Immature Granulocytes # 0.03 E9/L    Lymphocytes Absolute 1.20 (L) 1.50 - 4.00 E9/L    Monocytes Absolute 0.72 0.10 - 0.95 E9/L    Eosinophils Absolute 0.09 0.05 - 0.50 E9/L    Basophils Absolute 0.04 0.00 - 0.20 E9/L         Physicians Following Patient During Hospitalization - Reason For Care:  Admitting Physician: Era Rothman  Operating Physician: Kathy Kellogg Physician(s):    POD#1 Esther   POD#2 Era Rothman  Discharging Physician: Era Rothman  Consultant(s): n/a    Discharge Condition:  · Level of Function:  Stable  · Caregiver Arrangements and Educational Efforts: Written Discharge Instructions were verbally reviewed and given to the Patient. · Special Problems: None    Plans For Continuing Care:  · Unreported Test Results and Intended Follow-Up: 1 week(s) time. · Instructions for Physical Activity: No driving for 2 week(s).   No sex or tampon use for 6 weeks. No douching. No heavy lifting (greater than baby and carrier) for 6 weeks. Frequent ambulation with stairs - start slowly then increase as tolerated. Return to work in 10 -8 weeks. Showers are fine - avoid bath tubs, hot tubs, swimming. · Instructions for Diet: Regular diet  · Discharge Medications:  Current Discharge Medication List      START taking these medications    Details   oxyCODONE-acetaminophen (PERCOCET) 5-325 MG per tablet Take 1 tablet by mouth every 6 hours as needed for Pain for up to 5 days. Qty: 20 tablet, Refills: 0    Comments: Reduce doses taken as pain becomes manageable  Associated Diagnoses:  delivery, delivered, current hospitalization; Post-operative pain      ibuprofen (ADVIL;MOTRIN) 600 MG tablet Take 1 tablet by mouth every 6 hours as needed for Pain  Qty: 30 tablet, Refills: 0      lansinoh lanolin CREA ointment Apply 1 applicator topically every hour as needed for Dry Skin (nipple discomfort)      docusate sodium (COLACE, DULCOLAX) 100 MG CAPS Take 100 mg by mouth 2 times daily as needed for Constipation      simethicone (MYLICON) 80 MG chewable tablet Take 1 tablet by mouth every 6 hours as needed for Flatulence  Refills: 0         CONTINUE these medications which have NOT CHANGED    Details   ferrous sulfate (IRON 325) 325 (65 Fe) MG tablet Take 1 tablet by mouth 2 times daily  Qty: 30 tablet, Refills: 2      famotidine (PEPCID) 20 MG tablet Take 1 tablet by mouth 2 times daily  Qty: 60 tablet, Refills: 0      calcium carbonate (TUMS) 500 MG chewable tablet Take 1 tablet by mouth daily      Prenat w/o F-KA-Rzqznjn-FA-DHA (PNV-DHA) 27-0.6-0.4-300 MG CAPS Take by mouth      CLINDAMYCIN PHOSPHATE,TOPICAL, 1 % SWAB SWAB FACE ONCE TO TWICE DAILY AFTER GENTLE SOAP AND RINSE.   Qty: 60 each, Refills: 5    Associated Diagnoses: Acne vulgaris         STOP taking these medications       terconazole (TERAZOL 7) 0.4 % vaginal cream Comments:   Reason for Stopping:         doxyLAMINE succinate (GNP SLEEP AID) 25 MG tablet Comments:   Reason for Stopping:         vitamin B-6 (PYRIDOXINE) 25 MG tablet Comments:   Reason for Stopping:              Follow-Up Visit Plan: In 1 week for incision check and in 6-8  weeks for final postpartum visit.  Disposition: Home    Time Spent on Discharge:  30 minutes were spent in patient examination, evaluation, counseling as well as medication reconciliation, prescriptions for required medications, discharge plan and follow up.       Tony Carney MD,FACOG    12/6/2020 2:39 PM

## 2020-12-17 NOTE — BRIEF OP NOTE
Brief Postoperative Note      Patient: Valerie Baldwin  YOB: 1995  MRN: [id number]    Date of Procedure: 2020    Pre-Op Diagnosis: Multigravida IUP at 44 weeks 5/7 days, previous , declines , breech presentation    Post-Op Diagnosis: same + viable male infant       Procedure(s): Repeat low transverse  section    Surgeon(s):  Gume Paredes MD    First Assistant: Barak Stearns PA-C  With my technical assistance, Dr. Rosa Bullock performed a repeat  section. I assisted- in the absence of a surgical resident -with retraction, exposure, delivery of infant, and suture cutting/tissue management throughout the entire procedure. Anesthesia: Spinal    Estimated Blood Loss (mL): 500    Fluids: 1.7 IV Crystalloid    Complications: None    Specimens: cord blood, cord gases      Drains: (+) mcdonnell  [REMOVED] Urethral Catheter Non-latex 16 fr (Removed)   $ Urethral catheter insertion Inserted for procedure 20 1252   Urine Color Yellow 20 2145   Urine Appearance Clear 20 2145   Output (mL) 200 mL 20 2145     Findings: clear amniotic fluid. A living male infant was delivered at 10:05, weighing 7 lbs 13 oz/3,530 grams with Apgar scores of 9 at 1 minute & 9 at 5 minutes, respectively. nuchal cord present and reduced . (-) meconium. 3 vessel umbilical cord.  Tubes, uterus, ovaries:  Normal.    Disposition: both Mom and infant transferred to PACU in stable condition    Electronically signed by Barak Stearns PA-C on 2020 at 12:48 PM

## 2021-01-15 DIAGNOSIS — L70.0 ACNE VULGARIS: ICD-10-CM

## 2021-01-15 RX ORDER — CLINDAMYCIN PHOSPHATE 10 MG/ML
SOLUTION TOPICAL
Qty: 60 EACH | Refills: 5 | Status: SHIPPED
Start: 2021-01-15 | End: 2021-09-14

## 2021-01-15 NOTE — TELEPHONE ENCOUNTER
Last Appointment:  12/5/2019  Future Appointments   Date Time Provider Shi Orozco   1/20/2021  1:00 PM ANNIE Solomon - CHUY Clemens

## 2021-03-19 ENCOUNTER — TELEPHONE (OUTPATIENT)
Dept: ADMINISTRATIVE | Age: 26
End: 2021-03-19

## 2021-03-19 NOTE — TELEPHONE ENCOUNTER
Patient called for appt w/Dr Star Sharif, having vomiting and diarrhea for over a month now; offered Walk in locations

## 2021-03-24 ENCOUNTER — OFFICE VISIT (OUTPATIENT)
Dept: FAMILY MEDICINE CLINIC | Age: 26
End: 2021-03-24
Payer: COMMERCIAL

## 2021-03-24 VITALS
DIASTOLIC BLOOD PRESSURE: 68 MMHG | SYSTOLIC BLOOD PRESSURE: 108 MMHG | OXYGEN SATURATION: 94 % | TEMPERATURE: 97.5 F | HEART RATE: 80 BPM | BODY MASS INDEX: 26.22 KG/M2 | WEIGHT: 148 LBS

## 2021-03-24 DIAGNOSIS — R63.4 WEIGHT LOSS: ICD-10-CM

## 2021-03-24 DIAGNOSIS — F43.23 ADJUSTMENT DISORDER WITH MIXED ANXIETY AND DEPRESSED MOOD: ICD-10-CM

## 2021-03-24 DIAGNOSIS — K58.0 IRRITABLE BOWEL SYNDROME WITH DIARRHEA: ICD-10-CM

## 2021-03-24 DIAGNOSIS — K58.0 IRRITABLE BOWEL SYNDROME WITH DIARRHEA: Primary | ICD-10-CM

## 2021-03-24 LAB
ALBUMIN SERPL-MCNC: 4.4 G/DL (ref 3.5–5.2)
ALP BLD-CCNC: 50 U/L (ref 35–104)
ALT SERPL-CCNC: 10 U/L (ref 0–32)
ANION GAP SERPL CALCULATED.3IONS-SCNC: 15 MMOL/L (ref 7–16)
AST SERPL-CCNC: 22 U/L (ref 0–31)
BASOPHILS ABSOLUTE: 0.02 E9/L (ref 0–0.2)
BASOPHILS RELATIVE PERCENT: 0.5 % (ref 0–2)
BILIRUB SERPL-MCNC: 0.8 MG/DL (ref 0–1.2)
BUN BLDV-MCNC: 7 MG/DL (ref 6–20)
CALCIUM SERPL-MCNC: 9.5 MG/DL (ref 8.6–10.2)
CHLORIDE BLD-SCNC: 104 MMOL/L (ref 98–107)
CO2: 20 MMOL/L (ref 22–29)
CREAT SERPL-MCNC: 0.7 MG/DL (ref 0.5–1)
EOSINOPHILS ABSOLUTE: 0.13 E9/L (ref 0.05–0.5)
EOSINOPHILS RELATIVE PERCENT: 3 % (ref 0–6)
GFR AFRICAN AMERICAN: >60
GFR NON-AFRICAN AMERICAN: >60 ML/MIN/1.73
GLUCOSE BLD-MCNC: 75 MG/DL (ref 74–99)
HCT VFR BLD CALC: 40 % (ref 34–48)
HEMOGLOBIN: 11.7 G/DL (ref 11.5–15.5)
IMMATURE GRANULOCYTES #: 0.02 E9/L
IMMATURE GRANULOCYTES %: 0.5 % (ref 0–5)
LYMPHOCYTES ABSOLUTE: 1.15 E9/L (ref 1.5–4)
LYMPHOCYTES RELATIVE PERCENT: 26.3 % (ref 20–42)
MCH RBC QN AUTO: 25.2 PG (ref 26–35)
MCHC RBC AUTO-ENTMCNC: 29.3 % (ref 32–34.5)
MCV RBC AUTO: 86 FL (ref 80–99.9)
MONOCYTES ABSOLUTE: 0.44 E9/L (ref 0.1–0.95)
MONOCYTES RELATIVE PERCENT: 10.1 % (ref 2–12)
NEUTROPHILS ABSOLUTE: 2.61 E9/L (ref 1.8–7.3)
NEUTROPHILS RELATIVE PERCENT: 59.6 % (ref 43–80)
PDW BLD-RTO: 17 FL (ref 11.5–15)
PLATELET # BLD: 289 E9/L (ref 130–450)
PMV BLD AUTO: 12.1 FL (ref 7–12)
POTASSIUM SERPL-SCNC: 4.4 MMOL/L (ref 3.5–5)
RBC # BLD: 4.65 E12/L (ref 3.5–5.5)
SODIUM BLD-SCNC: 139 MMOL/L (ref 132–146)
TOTAL PROTEIN: 7.6 G/DL (ref 6.4–8.3)
TSH SERPL DL<=0.05 MIU/L-ACNC: 0.35 UIU/ML (ref 0.27–4.2)
WBC # BLD: 4.4 E9/L (ref 4.5–11.5)

## 2021-03-24 PROCEDURE — 99214 OFFICE O/P EST MOD 30 MIN: CPT | Performed by: FAMILY MEDICINE

## 2021-03-24 RX ORDER — CITALOPRAM 20 MG/1
TABLET ORAL
Qty: 30 TABLET | Refills: 5 | Status: SHIPPED
Start: 2021-03-24 | End: 2021-06-30

## 2021-03-24 SDOH — ECONOMIC STABILITY: FOOD INSECURITY: WITHIN THE PAST 12 MONTHS, YOU WORRIED THAT YOUR FOOD WOULD RUN OUT BEFORE YOU GOT MONEY TO BUY MORE.: NEVER TRUE

## 2021-03-24 SDOH — ECONOMIC STABILITY: TRANSPORTATION INSECURITY
IN THE PAST 12 MONTHS, HAS THE LACK OF TRANSPORTATION KEPT YOU FROM MEDICAL APPOINTMENTS OR FROM GETTING MEDICATIONS?: NO

## 2021-03-24 ASSESSMENT — PATIENT HEALTH QUESTIONNAIRE - PHQ9
SUM OF ALL RESPONSES TO PHQ9 QUESTIONS 1 & 2: 0
SUM OF ALL RESPONSES TO PHQ QUESTIONS 1-9: 0
2. FEELING DOWN, DEPRESSED OR HOPELESS: 0
SUM OF ALL RESPONSES TO PHQ QUESTIONS 1-9: 0

## 2021-03-24 NOTE — PROGRESS NOTES
[facility]  Office Progress Note - Dr. Zarina Marie  3/24/21    CC:   Chief Complaint   Patient presents with    Irritable Bowel Syndrome    Nausea & Vomiting        HPI:  Has been having recurrence of bowel habits that were present in 12/2019 when we last discussed. At the time, this was diagnosed as irritable bowel syndrome. We tried to treat with Bentyl and possibly a low FODMAP diet. She does not remember if the Bentyl was helpful or harmful. She does not recall any specific side effects. She eventually became pregnant and went off of all of her medications. She has since delivered the baby. While she was pregnant her bowel habits were excellent and she had improvement in abdominal pain and cramping. Since delivery her baseline symptoms have returned. Denies blood in the stool, no melena, bowel habits are often loose and frequent. She gets crampy lower abdominal pain. She tends to feel relief after having a bowel movement. She has also had nausea this time and rare vomiting. No epigastric abdominal pain similar to ulcers that she has experienced in the past.  She has lost some weight with this. And did so historically prior to becoming pregnant. Anxiety and depression  She also thinks that her anxiety and depression are worsening. May be experiencing some postpartum depression. She notes that she does not want to get out of bed in the morning lately and that is not normal for her. She also finds herself worrying about more things. Celexa had been helpful for her in the past.  She feels her partner is supportive. She feels she is bonding well with the baby. She does not recall side effects from the Celexa, but does notice that it often feels bad to come off of the medicine.        _________________________________________________________    Assessment / Pa Rasheed was seen today for irritable bowel syndrome and nausea & vomiting.     Diagnoses and all orders for this visit:    Irritable bowel syndrome with diarrhea, worsened  -     GLIADIN ANTIBODIES, SERUM; Future  -     CBC Auto Differential; Future  -     Comprehensive Metabolic Panel; Future  Still think that this best sounds like irritable bowel syndrome with diarrhea predominance. She is entertaining the possibility of a gluten insensitivity. She has been attempting to eliminate gluten from her diet. No major improvement. I am going to get some nucleated antibodies and see if these are present or not. She has had an upper endoscopy historically, but none recently. We will also recheck CBC and CMP status post delivery. Adjustment disorder with mixed anxiety and depressed mood, worsened  -Start   citalopram (CELEXA) 20 MG tablet; TAKE 1 TABLET BY MOUTH EVERY DAY. Start with a half tablet daily the first week. We will restart SSRI therapy which has helped for significantly in past without side effects. If we are to stop this in the future, do a longer weaning timeframe as coming off the medicine was difficult in the past.    Weight loss  -     TSH without Reflex; Future  She has noted the weight loss and mood changes above. We will check a TSH to see if she is having any postpartum changes. Return in about 1 month (around 4/24/2021). or as scheduled. Patient counseled to follow up sooner or seek more acute care if symptoms worsening or not improving according to plan.      Electronically signed by Simeon Moya MD on 3/24/2021    _________________________________________________________  Current Outpatient Medications on File Prior to Visit   Medication Sig Dispense Refill    drospirenone-ethinyl estradiol (DEBO) 3-0.02 MG per tablet Take 1 tablet by mouth daily 28 tablet 4    CLINDAMYCIN PHOSPHATE,TOPICAL, 1 % SWAB SWAB FACE ONCE TO TWICE DAILY AFTER GENTLE SOAP AND RINSE 60 each 5    ferrous sulfate (IRON 325) 325 (65 Fe) MG tablet Take 1 tablet by mouth 2 times daily (Patient not taking: Reported on 3/24/2021) 30 tablet 2    famotidine (PEPCID) 20 MG tablet Take 1 tablet by mouth 2 times daily (Patient not taking: Reported on 3/24/2021) 60 tablet 0    Prenat w/o R-IU-Jstbnkx-FA-DHA (PNV-DHA) 27-0.6-0.4-300 MG CAPS Take by mouth       No current facility-administered medications on file prior to visit. Patient Active Problem List   Diagnosis Code    Maternal varicella, non-immune O09.899, Z28.3    44 5/7 weeks gestation of pregnancy Z3A.39    Multigravida in third trimester Z34.83    Previous  delivery affecting pregnancy- Progressed to 8 cm. emergent PLTCS for Fetal bradycardia. History of abscess of C/S incision.  C/S 20 at 0930 if not delivered sooner O31.200    Patient declines vaginal birth after  section () O34.219    Group B Streptococcus carrier, +RV culture, currently pregnant O99.820    Iron deficiency anemia of pregnancy-taking FeSO4 twice daily O99.019, D50.9    Normal pregnancy in third trimester Z34.93     delivery, delivered, current hospitalization O80    Term birth of male  Z45.0    Vertex presentation of fetus in third trimester Z34.93    Nuchal cord without compression, delivered, current hospitalization O69.81X0    Postoperative anemia due to acute blood loss D62     _________________________________________________________  Past Medical History:   Diagnosis Date    Anemia     Chronic kidney disease     Depression     no medications    GERD (gastroesophageal reflux disease)     Heart abnormality     Hiatal hernia     PONV (postoperative nausea and vomiting)     Thyroid disease     Trauma     Ulcer        Family History   Problem Relation Age of Onset    Heart Attack Father [id number]        LAD    Diabetes Maternal Grandmother     Hypertension Maternal Grandfather        Past Surgical History:   Procedure Laterality Date     SECTION  2012     SECTION N/A 2020     SECTION performed [facility]hel [facility]bar [facility]    ENDOSCOPY, COLON, DIAGNOSTIC  12/14/2015    HYSTEROSCOPY  05/08/2018    D&C, polypectomy    INCISE AND DRAIN ABCESS  7/3/2012         UPPER GASTROINTESTINAL ENDOSCOPY  9/28/2015       Social History     Tobacco Use    Smoking status: Never Smoker    Smokeless tobacco: Never Used   Substance Use Topics    Alcohol use: No     Alcohol/week: 0.0 standard drinks    Drug use: No       Chart reviewed and updated where appropriate for PMH, Fam, and Soc Hx.  _________________________________________________________  ROS: POSITIVE: As in the HPI. Otherwise Pertinent negatives are negative.    __________________________________________________________  Physical Exam   /68   Pulse 80   Temp 97.5 °F (36.4 °C)   Wt 148 lb (67.1 kg)   SpO2 94%   BMI 26.22 kg/m²   Wt Readings from Last 3 Encounters:   03/24/21 148 lb (67.1 kg)   01/20/21 165 lb 9.6 oz (75.1 kg)   12/16/20 164 lb 12.8 oz (74.8 kg)       Constitutional:    She is oriented to person, place, and time. She appears well-developed and well-nourished. Eyes:    Conjunctivae are normal.    Pupils are equal, round, and reactive to light. EOMI. Neck:    Normal range of motion. No thyromegaly or nodules noted. No bruit. Cardiovascular:    Normal rate, regular rhythm and normal heart sounds. No murmur. No gallop and no friction rub. Pulmonary/Chest:    Effort normal and breath sounds normal.    No wheezes. No rales or rhonchi. Abdominal:    Soft. Bowel sounds are normal.    No distension. No tenderness. Musculoskeletal:    Normal range of motion. No joint swelling noted. No peripheral edema. Skin:    Skin is warm and dry. No rashes, No lesions. Psychiatric:    She has a normal mood and affect. Normal groom and dress. No SI or HI.   ________________________________________________________    This note may have been created using dictation software.  Efforts were made to reduce grammatical or syntax errors, but some may persist.

## 2021-03-28 LAB
GLIADIN ANTIBODIES IGA: 1.9 U/ML
GLIADIN ANTIBODIES IGG: 2.5 U/ML

## 2021-03-29 RX ORDER — LOPERAMIDE HYDROCHLORIDE 2 MG/1
2 CAPSULE ORAL 2 TIMES DAILY PRN
Qty: 60 CAPSULE | Refills: 1 | Status: SHIPPED | OUTPATIENT
Start: 2021-03-29 | End: 2021-04-08

## 2021-03-29 RX ORDER — DICYCLOMINE HCL 20 MG
20 TABLET ORAL 2 TIMES DAILY PRN
Qty: 60 TABLET | Refills: 1 | Status: SHIPPED
Start: 2021-03-29 | End: 2021-04-27

## 2021-04-02 ENCOUNTER — TELEPHONE (OUTPATIENT)
Dept: FAMILY MEDICINE CLINIC | Age: 26
End: 2021-04-02

## 2021-04-02 RX ORDER — ONDANSETRON 4 MG/1
4 TABLET, FILM COATED ORAL DAILY PRN
Qty: 30 TABLET | Refills: 0 | Status: SHIPPED
Start: 2021-04-02 | End: 2021-04-27 | Stop reason: SDUPTHER

## 2021-04-02 NOTE — TELEPHONE ENCOUNTER
Theresa has been experiencing nausea for the past few days and asking if you would send something for that to [address] [place] [address]? She has no other symptoms and is not vomiting.

## 2021-04-27 ENCOUNTER — OFFICE VISIT (OUTPATIENT)
Dept: FAMILY MEDICINE CLINIC | Age: 26
End: 2021-04-27
Payer: COMMERCIAL

## 2021-04-27 VITALS
DIASTOLIC BLOOD PRESSURE: 78 MMHG | TEMPERATURE: 96.9 F | OXYGEN SATURATION: 95 % | WEIGHT: 140.4 LBS | HEIGHT: 63 IN | SYSTOLIC BLOOD PRESSURE: 108 MMHG | HEART RATE: 79 BPM | RESPIRATION RATE: 18 BRPM | BODY MASS INDEX: 24.88 KG/M2

## 2021-04-27 DIAGNOSIS — K58.0 IRRITABLE BOWEL SYNDROME WITH DIARRHEA: ICD-10-CM

## 2021-04-27 DIAGNOSIS — B35.1 ONYCHOMYCOSIS: Primary | ICD-10-CM

## 2021-04-27 DIAGNOSIS — F43.23 ADJUSTMENT DISORDER WITH MIXED ANXIETY AND DEPRESSED MOOD: ICD-10-CM

## 2021-04-27 DIAGNOSIS — L40.9 PSORIASIS: ICD-10-CM

## 2021-04-27 PROCEDURE — 99214 OFFICE O/P EST MOD 30 MIN: CPT | Performed by: FAMILY MEDICINE

## 2021-04-27 RX ORDER — ONDANSETRON 4 MG/1
4 TABLET, FILM COATED ORAL DAILY PRN
Qty: 30 TABLET | Refills: 1 | Status: SHIPPED
Start: 2021-04-27 | End: 2022-07-14 | Stop reason: SDUPTHER

## 2021-04-27 RX ORDER — LOPERAMIDE HYDROCHLORIDE 2 MG/1
2 CAPSULE ORAL 2 TIMES DAILY PRN
COMMUNITY
End: 2022-08-08

## 2021-04-27 RX ORDER — TERBINAFINE HYDROCHLORIDE 250 MG/1
250 TABLET ORAL DAILY
Qty: 90 TABLET | Refills: 0 | Status: SHIPPED
Start: 2021-04-27 | End: 2021-08-16 | Stop reason: SDUPTHER

## 2021-04-27 NOTE — PROGRESS NOTES
[facility]  Office Progress Note - Dr. Naveed Smith  4/27/21    CC:   Chief Complaint   Patient presents with    Follow-up     one month         /78   Pulse 79   Temp 96.9 °F (36.1 °C)   Resp 18   Ht 5' 3\" (1.6 m)   Wt 140 lb 6.4 oz (63.7 kg)   SpO2 95%   BMI 24.87 kg/m²   Wt Readings from Last 3 Encounters:   04/27/21 140 lb 6.4 oz (63.7 kg)   03/24/21 148 lb (67.1 kg)   01/20/21 165 lb 9.6 oz (75.1 kg)       HPI: \"Things are getting better and I think the sunshine helps a lot. \"  Started back with therapy. That has been helpful as well. Feels like more motivation, wants to get out of bed again everyday. Restarting citalopram has gone well without stomach side effects. She had zofran on had and imodium for prn symptoms  Feels like since anxiety has been better controlled. Feels like bonding well with son, and no different over past month -son Dona Lowery. Weight still down 8 pounds over past month. Appetite is low. Normal weight has been 125-130. TSh normal 0.349  Glu 75,   Cr, LFTs normal.   Hgb improving s/p delivery up to 11.7 now. Believe she likely has toenail fungus. Her mother has something similar. Terbinafine was helpful for her mother and patient is interested in this. On exam, patient has a recent pedicure, so I am not able to evaluate the nail surface, but she does have typical thickening and chipping of the nail underneath the surface. She also has background of psoriasis and we discussed that she could be having some psoriatic nail changes. Discussed risks of terbinafine therapy.    _________________________________________________________    Assessment / Rigoberto Dionne was seen today for follow-up. Diagnoses and all orders for this visit:    Onychomycosis  -Start   terbinafine (LAMISIL) 250 MG tablet; Take 1 tablet by mouth daily  -In 6 weeks,   HEPATIC FUNCTION PANEL;  Future  Discussed that we could miss the diagnosis on this if the nail weeks gestation of pregnancy Z3A.39    Multigravida in third trimester Z34.83    Previous  delivery affecting pregnancy- Progressed to 8 cm. emergent PLTCS for Fetal bradycardia. History of abscess of C/S incision. C/S 20 at 0930 if not delivered sooner O31.200    Patient declines vaginal birth after  section () O34.219    Group B Streptococcus carrier, +RV culture, currently pregnant O99.820    Iron deficiency anemia of pregnancy-taking FeSO4 twice daily O99.019, D50.9    Normal pregnancy in third trimester Z34.93     delivery, delivered, current hospitalization O80    Term birth of male  Z45.0    Vertex presentation of fetus in third trimester Z34.93    Nuchal cord without compression, delivered, current hospitalization O69.81X0    Postoperative anemia due to acute blood loss D62     _________________________________________________________  Past Medical History:   Diagnosis Date    Anemia     Chronic kidney disease     Depression     no medications    GERD (gastroesophageal reflux disease)     Heart abnormality     Hiatal hernia     PONV (postoperative nausea and vomiting)     Thyroid disease     Trauma     Ulcer        Family History   Problem Relation Age of Onset    Heart Attack Father 45        LAD    Diabetes Maternal Grandmother     Hypertension Maternal Grandfather        Past Surgical History:   Procedure Laterality Date     SECTION  2012     SECTION N/A 2020     SECTION [facility]    ENDOSCOPY, COLON, DIAGNOSTIC  2015    HYSTEROSCOPY  2018    D&C, polypectomy    INCISE AND DRAIN ABCESS  7/3/2012         UPPER GASTROINTESTINAL ENDOSCOPY  2015       Social History     Tobacco Use    Smoking status: Never Smoker    Smokeless tobacco: Never Used   Substance Use Topics    Alcohol use: No     Alcohol/week: 0.0 standard drinks    Drug use:  No Chart reviewed and updated where appropriate for PMH, Fam, and Soc Hx.  _________________________________________________________  ROS: POSITIVE: As in the HPI. Otherwise Pertinent negatives are negative.    __________________________________________________________  Physical Exam   Constitutional:    She is oriented to person, place, and time. She appears well-developed and well-nourished. Neck:    Normal range of motion. No thyromegaly or nodules noted. No bruit. No LAD. Cardiovascular:    Normal rate, regular rhythm and normal heart sounds. No murmur. No gallop and no friction rub. Pulmonary/Chest:    Effort normal and breath sounds normal.    No wheezes. No rales or rhonchi. Abdominal:    Soft. Bowel sounds are normal.    No distension. No tenderness. Skin:    Skin is warm and dry. No rashes, No lesions. Psychiatric:    She has a normal mood and affect. Normal groom and dress. No SI or HI.   ________________________________________________________    This note may have been created using dictation software.  Efforts were made to reduce errors, but some may persist.

## 2021-05-10 ENCOUNTER — OFFICE VISIT (OUTPATIENT)
Dept: FAMILY MEDICINE CLINIC | Age: 26
End: 2021-05-10
Payer: COMMERCIAL

## 2021-05-10 VITALS
HEART RATE: 84 BPM | RESPIRATION RATE: 18 BRPM | BODY MASS INDEX: 24.45 KG/M2 | TEMPERATURE: 97.7 F | OXYGEN SATURATION: 98 % | HEIGHT: 63 IN | WEIGHT: 138 LBS | DIASTOLIC BLOOD PRESSURE: 74 MMHG | SYSTOLIC BLOOD PRESSURE: 112 MMHG

## 2021-05-10 DIAGNOSIS — R51.9 NONINTRACTABLE HEADACHE, UNSPECIFIED CHRONICITY PATTERN, UNSPECIFIED HEADACHE TYPE: Primary | ICD-10-CM

## 2021-05-10 PROCEDURE — 99204 OFFICE O/P NEW MOD 45 MIN: CPT | Performed by: PHYSICIAN ASSISTANT

## 2021-05-10 RX ORDER — PROCHLORPERAZINE MALEATE 10 MG
10 TABLET ORAL EVERY 6 HOURS PRN
Qty: 20 TABLET | Refills: 0 | Status: SHIPPED
Start: 2021-05-10 | End: 2021-06-30

## 2021-05-10 RX ORDER — METHOCARBAMOL 750 MG/1
750 TABLET, FILM COATED ORAL 3 TIMES DAILY PRN
Qty: 15 TABLET | Refills: 0 | Status: SHIPPED | OUTPATIENT
Start: 2021-05-10 | End: 2021-05-15

## 2021-05-10 NOTE — PROGRESS NOTES
5/10/21  Deidre D Magill : 1995 Sex: female  Age 27 y.o. Subjective:  Chief Complaint   Patient presents with    Headache     since saturday          HPI:   Theresa SKINNER [de-identified] , 27 y.o. female presents to [facility] care for evaluation of headache. The patient has had a headache for the last 8 9 days now. The patient states that it started about a week ago on a Saturday. There is nothing specific about the headache when it occurred. The patient has noted that her eyes are sore. The patient has no nausea, light sensitivity, no phono sensitivity. The patient does not any history of migraines. No formal diagnosis of migraines but she refers to this as a \"migraine. \"  She has not had previous CT of the brain or MRI. No tinnitus. No lateralizing weakness of the upper or lower extremities. The patient states that she is will take Excedrin and it will help her headache. ROS:   Unless otherwise stated in this report the patient's positive and negative responses for review of systems for constitutional, eyes, ENT, cardiovascular, respiratory, gastrointestinal, neurological, , musculoskeletal, and integument systems and related systems to the presenting problem are either stated in the history of present illness or were not pertinent or were negative for the symptoms and/or complaints related to the presenting medical problem. Positives and pertinent negatives as per HPI. All others reviewed and are negative.       PMH:     Past Medical History:   Diagnosis Date    Anemia     Chronic kidney disease     Depression     no medications    GERD (gastroesophageal reflux disease)     Heart abnormality     Hiatal hernia     PONV (postoperative nausea and vomiting)     Thyroid disease     Trauma     Ulcer        Past Surgical History:   Procedure Laterality Date     SECTION  2012     SECTION N/A 2020     SECTION [facility]    ENDOSCOPY, COLON, DIAGNOSTIC  12/14/2015    HYSTEROSCOPY  05/08/2018    D&C, polypectomy    INCISE AND DRAIN ABCESS  7/3/2012         UPPER GASTROINTESTINAL ENDOSCOPY  9/28/2015       Family History   Problem Relation Age of Onset    Heart Attack Father 45        LAD    Diabetes Maternal Grandmother     Hypertension Maternal Grandfather        Medications:     Current Outpatient Medications:     prochlorperazine (COMPAZINE) 10 MG tablet, Take 1 tablet by mouth every 6 hours as needed (headache/nausea), Disp: 20 tablet, Rfl: 0    methocarbamol (ROBAXIN-750) 750 MG tablet, Take 1 tablet by mouth 3 times daily as needed (headache), Disp: 15 tablet, Rfl: 0    ondansetron (ZOFRAN) 4 MG tablet, Take 1 tablet by mouth daily as needed for Nausea or Vomiting, Disp: 30 tablet, Rfl: 1    loperamide (IMODIUM) 2 MG capsule, Take 2 mg by mouth 2 times daily as needed for Diarrhea, Disp: , Rfl:     terbinafine (LAMISIL) 250 MG tablet, Take 1 tablet by mouth daily, Disp: 90 tablet, Rfl: 0    citalopram (CELEXA) 20 MG tablet, TAKE 1 TABLET BY MOUTH EVERY DAY. Start with a half tablet daily the first week., Disp: 30 tablet, Rfl: 5    drospirenone-ethinyl estradiol (DEBO) 3-0.02 MG per tablet, Take 1 tablet by mouth daily, Disp: 28 tablet, Rfl: 4    CLINDAMYCIN PHOSPHATE,TOPICAL, 1 % SWAB, SWAB FACE ONCE TO TWICE DAILY AFTER GENTLE SOAP AND RINSE, Disp: 60 each, Rfl: 5    Allergies: Allergies   Allergen Reactions    Iv Dye [Iodides] Other (See Comments)     Shut down kidneys, required hospitalization.     Medrol [Methylprednisolone]      IV caused tingling to arms legs face    Percocet [Oxycodone-Acetaminophen] Nausea Only and Other (See Comments)     Jaundice, fever, chills and hallucinations       Social History:     Social History     Tobacco Use    Smoking status: Never Smoker    Smokeless tobacco: Never Used   Substance Use Topics    Alcohol use: No     Alcohol/week: 0.0 standard drinks    Drug use: No       Patient lives at home. Physical Exam:     Vitals:    05/10/21 1626   BP: 112/74   Pulse: 84   Resp: 18   Temp: 97.7 °F (36.5 °C)   SpO2: 98%   Weight: 138 lb (62.6 kg)   Height: 5' 3\" (1.6 m)       Exam:  Physical Exam  Vital signs and nurses notes reviewed. The patient is not hypoxic. ? General: The patient appears well and in no apparent distress. Patient is resting comfortably on cart. Skin: Warm, dry, no pallor noted. The patient has no evidence of rash, petechiae, or purpura noted. Head: Normocephalic, atraumatic, no temporal arterial tenderness  Neck: Supple, trachea mid-line, no tenderness, no lymphadenopathy. No meningeal signs. No nuchal rigidity. Negative jolt test.  Eye: Pupils are equal, round and reactive to light, EOMI  Ears, Nose, Mouth, and Throat: Oral mucosa is moist, TMs are clear bilaterally, no hemotympanum noted. Cardiovascular: Regular Rate and Rhythm  Respiratory: Patient is in no distress, no accessory muscle use, lungs are clear to auscultation, no wheezing, rales or rhonchi  Back: non-tender, no CVA tenderness  Musculoskeletal: normal ROM, no tenderness, no swelling, normal strength 5/5. Normal pulses to radial 2+ bilaterally and 2+ at [facility] and PT bilaterally and symmetrically. GI: Normal bowel sounds, no tenderness to palpation, no masses appreciated. No rebound, guarding, or rigidity noted. Neurological: A&O x4, normal equal  strength, normal finger to nose, no pronator drift. The patient is not ataxic. The patient has normal speech. The patient has normal coordination. Normal motor and sensory observed. Psychiatric: Cooperative       Testing:     CT of the brain pending      Medical Decision Making:     Vital signs reviewed    Past medical history reviewed. Allergies reviewed. Medications reviewed. Patient on arrival does not appear to be in any apparent distress or discomfort.   The patient was recommended to be evaluated in the emergency department for further evaluation possible imaging of the head and to help with her headache. The patient is declining at this time. The patient would like to try to have the head evaluated as an outpatient and do imaging outpatient. The patient will be sent for a CT of the brain. We will try to schedule this as soon as possible. The patient additionally declined all intramuscular treatments here including Phenergan, Benadryl, and ketorolac. The patient is allergic to methylprednisone. The patient will be treated with Compazine and with Robaxin for home. She may use Motrin and Tylenol as needed. The patient is to call with any questions or concerns. Would like her to try to follow-up with PCP and with neurology. Clinical Impression:   Zi Sandoval was seen today for headache. Diagnoses and all orders for this visit:    Nonintractable headache, unspecified chronicity pattern, unspecified headache type  -     CT HEAD WO CONTRAST; Future    Other orders  -     prochlorperazine (COMPAZINE) 10 MG tablet; Take 1 tablet by mouth every 6 hours as needed (headache/nausea)  -     methocarbamol (ROBAXIN-750) 750 MG tablet; Take 1 tablet by mouth 3 times daily as needed (headache)        The patient is to call for any concerns or return if any of the signs or symptoms worsen. The patient is to follow-up with PCP in the next 2-3 days for repeat evaluation repeat assessment or go directly to the emergency department.      SIGNATURE: Nitish Pal III, PA-C

## 2021-05-18 DIAGNOSIS — R51.9 NONINTRACTABLE HEADACHE, UNSPECIFIED CHRONICITY PATTERN, UNSPECIFIED HEADACHE TYPE: ICD-10-CM

## 2021-08-16 ENCOUNTER — TELEPHONE (OUTPATIENT)
Dept: FAMILY MEDICINE CLINIC | Age: 26
End: 2021-08-16

## 2021-08-16 DIAGNOSIS — B35.1 ONYCHOMYCOSIS: ICD-10-CM

## 2021-08-16 RX ORDER — TERBINAFINE HYDROCHLORIDE 250 MG/1
250 TABLET ORAL DAILY
Qty: 90 TABLET | Refills: 0 | Status: SHIPPED | OUTPATIENT
Start: 2021-08-16 | End: 2021-11-14

## 2021-08-16 NOTE — TELEPHONE ENCOUNTER
Last Appointment:  4/27/2021  Future Appointments   Date Time Provider Shi Escalantei   10/26/2021 12:40 PM MD VIK Moran [facility]   7/1/2022  2:30 PM ANNIE Irizarry - CHUY Maravilla

## 2021-08-16 NOTE — TELEPHONE ENCOUNTER
Pt called to ask if there was anything else you could prescribe instead of Clindamycin?   She states that her insurance no longer covers and she has a copay of $35.

## 2021-08-17 RX ORDER — ERYTHROMYCIN BASE IN ETHANOL 2 %
SWAB, MEDICATED TOPICAL
Qty: 60 EACH | Refills: 2 | Status: SHIPPED
Start: 2021-08-17 | End: 2021-09-14

## 2021-09-10 ENCOUNTER — TELEPHONE (OUTPATIENT)
Dept: FAMILY MEDICINE CLINIC | Age: 26
End: 2021-09-10

## 2021-09-10 NOTE — TELEPHONE ENCOUNTER
Erythromycin 2% pads not covered by insurance. I asked pharmacy if any other formulation may be covered or covered better, pharmacist states no known alternatives listed. PA med?   Please advise

## 2021-09-14 RX ORDER — CLINDAMYCIN PHOSPHATE 10 MG/ML
SOLUTION TOPICAL
Qty: 60 EACH | Refills: 3 | Status: SHIPPED
Start: 2021-09-14 | End: 2021-10-26 | Stop reason: SINTOL

## 2021-10-04 ENCOUNTER — TELEPHONE (OUTPATIENT)
Dept: FAMILY MEDICINE CLINIC | Age: 26
End: 2021-10-04

## 2021-10-04 NOTE — TELEPHONE ENCOUNTER
----- Message from Dandy Bellamyers sent at 9/24/2021  2:30 PM EDT -----  Subject: Message to Provider    QUESTIONS  Information for Provider? Patients appointment on 11/3/2021 is a Yani CATHERINE, Phone? [phone]  ---------------------------------------------------------------------------  --------------  [address]  What is the best way for the office to contact you? OK to leave message on   voicemail  Preferred Call Back Phone Number? [phone]  ---------------------------------------------------------------------------  --------------  SCRIPT ANSWERS  Relationship to Patient?  Self

## 2021-10-26 ENCOUNTER — VIRTUAL VISIT (OUTPATIENT)
Dept: FAMILY MEDICINE CLINIC | Age: 26
End: 2021-10-26
Payer: COMMERCIAL

## 2021-10-26 DIAGNOSIS — F32.A ANXIETY AND DEPRESSION: Primary | ICD-10-CM

## 2021-10-26 DIAGNOSIS — F41.9 ANXIETY AND DEPRESSION: Primary | ICD-10-CM

## 2021-10-26 PROCEDURE — 99213 OFFICE O/P EST LOW 20 MIN: CPT | Performed by: FAMILY MEDICINE

## 2021-10-26 RX ORDER — ESCITALOPRAM OXALATE 10 MG/1
10 TABLET ORAL DAILY
Qty: 30 TABLET | Refills: 5 | Status: SHIPPED
Start: 2021-10-26 | End: 2022-04-19

## 2021-10-26 ASSESSMENT — ENCOUNTER SYMPTOMS
VOMITING: 0
ABDOMINAL PAIN: 0
CHEST TIGHTNESS: 0
GASTROINTESTINAL NEGATIVE: 1
BLOOD IN STOOL: 0
SHORTNESS OF BREATH: 0
CONSTIPATION: 0
NAUSEA: 0
COUGH: 0
RESPIRATORY NEGATIVE: 1
DIARRHEA: 0

## 2021-10-26 NOTE — PROGRESS NOTES
10/26/2021    TELEHEALTH EVALUATION -- Audio/Visual (During KGPAG-14 public health emergency)    HPI:    Deidre D Magill (:  1995) has requested an audio/video evaluation for the following concern(s):    Feels mood up and down lately. Depressive and axious symptoms. Past couple weeks has been worse. Restarted OCP about 3 months postpartum. She feels like her mood started to worsen around this time. DC'ed that about 4 days ago. Took citalopram for about 3 months earlier this year. She had cutting behavior the other night. Cut her leg. Says she just wanted to feel like she could control something. She has been attending online therapist appts. weekly. Feels like that alone isnt helping enough. She had tried citalopram earlier in the year and for the first 2 weeks it felt like it was helping well, but then no longer felt like it was working and had some increased anxiety side effects. Some thoughts of death but not suicide. Feels like she wants \"and out. \"  Says that she would never kill herself because she has too much to live for including her 2 children. She feels supported by her . He has been taking off work in order to help her on difficult days. She does have financial concerns about that and feels bad about that. Review of Systems   Constitutional: Negative for appetite change, fatigue and fever. HENT: Negative. Respiratory: Negative. Negative for cough, chest tightness and shortness of breath. Cardiovascular: Negative. Negative for chest pain and palpitations. Gastrointestinal: Negative. Negative for abdominal pain, blood in stool, constipation, diarrhea, nausea and vomiting. Genitourinary: Negative. Negative for dysuria, frequency and urgency. Musculoskeletal: Negative. Negative for arthralgias and myalgias. Skin: Positive for rash. Neurological: Negative. Negative for dizziness, syncope, light-headedness and headaches. Social History     Tobacco Use    Smoking status: Never Smoker    Smokeless tobacco: Never Used   Substance Use Topics    Alcohol use: No     Alcohol/week: 0.0 standard drinks    Drug use: No       PHYSICAL EXAMINATION:  Vital Signs: (As obtained by patient/caregiver or practitioner observation)    Blood pressure-  Heart rate-    Respiratory rate-    Temperature-  Pulse oximetry-     Constitutional: Appears well-developed and well-nourished     No apparent distress             [] Abnormal-   Mental status:      Alert and awake       Oriented to person/place/time      Able to follow commands              [] Abnormal   Eyes:  EOM     Normal         Sclera  Normal               Discharge None visible             [] Abnormal-   HENT:      Normocephalic, atraumatic. Mucous membranes are moist.             [] Abnormal   External Ears:      Normal              [] Abnormal-  Neck:                No visualized mass              [] Abnormal-  Pulmonary/Chest:Respiratory effort normal.           No visualized signs of difficulty breathing or respiratory distress         [] Abnormal-    Musculoskeletal: Normal gait with no signs of ataxia       Normal range of motion of neck         [] Abnormal-   Neurological:   No Facial Asymmetry (Cranial nerve 7 motor function) (limited exam to video visit)      No gaze palsy          [] Abnormal-       Skin:    No significant exanthematous lesions or discoloration noted on facial skin           [] Abnormal-         Psychiatric:  Normal to tearful affect. Mood is depressed and anxious. No thoughts of suicide, but thoughts of death. No homicidal ideations. Some cutting behavior for the first time in many years recently. No Hallucinations          [] Abnormal-     ASSESSMENT/PLAN:  1. Anxiety and depression  Worsened recently  -Start escitalopram (LEXAPRO) 10 MG tablet; Take 1 tablet by mouth daily  Dispense: 30 tablet;  Refill: 5  Continue weekly visits with therapist.  Call with significant worsening of mood, side effects from medicine, suicidal ideas. Feels supported. Seek emergent care if significant suicidal thoughts or plan to harm self is forming. Return in about 1 month (around 11/26/2021). Deidre D Magill, was evaluated through a synchronous (real-time) audio-video encounter. The patient (or guardian if applicable) is aware that this is a billable service. Verbal consent to proceed has been obtained within the past 12 months. The visit was conducted pursuant to the emergency declaration under the [address] [place] [address], [address] [place] [address] authority and the [employer] and [employer] General Act. Patient identification was verified, and a caregiver was present when appropriate. The patient was located in a state where the provider was credentialed to provide care. Total time spent on this encounter: Not billed by time    --Xochilt Mitchell MD on 10/26/2021 at 5:54 PM    An electronic signature was used to authenticate this note.

## 2021-11-10 ENCOUNTER — PATIENT MESSAGE (OUTPATIENT)
Dept: FAMILY MEDICINE CLINIC | Age: 26
End: 2021-11-10

## 2021-11-10 DIAGNOSIS — R21 FACIAL RASH: Primary | ICD-10-CM

## 2021-11-11 ENCOUNTER — TELEPHONE (OUTPATIENT)
Dept: FAMILY MEDICINE CLINIC | Age: 26
End: 2021-11-11

## 2021-11-11 NOTE — TELEPHONE ENCOUNTER
Okay to substitute 1 month of topical solution. Directions, use nightly on acne areas while washing face.

## 2022-01-15 ENCOUNTER — PATIENT MESSAGE (OUTPATIENT)
Dept: FAMILY MEDICINE CLINIC | Age: 27
End: 2022-01-15

## 2022-01-15 DIAGNOSIS — B35.1 ONYCHOMYCOSIS: Primary | ICD-10-CM

## 2022-01-18 NOTE — TELEPHONE ENCOUNTER
From: Kristan Mcconnell  To: Dr. Hafsa Stone  Sent: 1/15/2022 11:32 AM EST  Subject: Routine lab work for lamisil     Hello this message is for Dr. Aurelio Motta. I am just reaching out to see if there is an order for lab work in the computer so that I can get blood drawn for a Lamisil tablet refill. Thank you so much have a great weekend.

## 2022-04-18 ENCOUNTER — PATIENT MESSAGE (OUTPATIENT)
Dept: FAMILY MEDICINE CLINIC | Age: 27
End: 2022-04-18

## 2022-04-18 NOTE — TELEPHONE ENCOUNTER
From: Aye Ortiz  To: Dr. Saeed Midget: 4/18/2022 10:25 AM EDT  Subject: Percilla Favors Dr STEPHENS, just wondering how long I should give the Lexapro to adjust with my body before I quit taking it and try another. The GI symptoms are very intense, as it was with celexa. I'm having very bad diarrhea that takes 2 imodium tablets to stop. I'm nauseous, sweaty, I feel weak, and it's been 5 days. I was just wondering how long I should expect this, and if it is normal, or if you recommend trying something that is easier on the stomach. Thanks so much. I appreciate your time.

## 2022-04-19 RX ORDER — ARIPIPRAZOLE 5 MG/1
5 TABLET ORAL DAILY
Qty: 30 TABLET | Refills: 3 | Status: SHIPPED
Start: 2022-04-19 | End: 2022-08-08

## 2022-05-18 ENCOUNTER — PATIENT MESSAGE (OUTPATIENT)
Dept: FAMILY MEDICINE CLINIC | Age: 27
End: 2022-05-18

## 2022-05-18 NOTE — TELEPHONE ENCOUNTER
From: Ne Roland  To: Dr. Royal Benson: 5/18/2022 7:46 AM EDT  Subject: Tretinoin acne ointment     Hi Dr Alfonso Varela, I hope you are having a good week. I just wanted to reach out about the acne product I've been using for a couple of years (clindamyacin) recently I feel it is working less effectively, I am getting breakouts pretty frequently when for years it worked perfectly. I wondered if you would be able to send a prescription for topical tretinoin ointment? I am trying to avoid going to [facility], after paying them over $600 and getting no answers and the Dr not listening to my concerns, I wasn't very impressed with their practice unfortunately. Thank you so much for your help.

## 2022-05-19 RX ORDER — TRETINOIN 0.4 MG/G
GEL TOPICAL NIGHTLY
Qty: 50 G | Refills: 3 | Status: SHIPPED
Start: 2022-05-19 | End: 2022-06-02

## 2022-05-23 NOTE — TELEPHONE ENCOUNTER
PA initiated viz Covermymeds 05/20/22. Key: [id number]    Awaiting insurance response.

## 2022-06-02 RX ORDER — ERYTHROMYCIN BASE IN ETHANOL 2 %
SWAB, MEDICATED TOPICAL
Qty: 60 EACH | Refills: 0 | Status: SHIPPED
Start: 2022-06-02 | End: 2022-08-08

## 2022-06-02 RX ORDER — ADAPALENE 0.1 G/100G
CREAM TOPICAL
Qty: 45 G | Refills: 0 | Status: SHIPPED | OUTPATIENT
Start: 2022-06-02 | End: 2022-07-02

## 2022-06-14 ENCOUNTER — PATIENT MESSAGE (OUTPATIENT)
Dept: FAMILY MEDICINE CLINIC | Age: 27
End: 2022-06-14

## 2022-06-14 NOTE — TELEPHONE ENCOUNTER
From: Young Barcenas  To: Dr. Nirali Daniels  Sent: 6/14/2022 2:30 PM EDT  Subject: Sethmedardo Hicks paperwork    Hi Dr. Digna Han, I hope you are doing well this week. I wanted to reach out regarding [facility] paperwork. My  contacted the HR department at [employer] today, and they are sending us a packet of paperwork, I was just wondering the best way to go about getting it signed by you. Thank you so much for your time, I greatly appreciate it.

## 2022-07-14 DIAGNOSIS — K58.0 IRRITABLE BOWEL SYNDROME WITH DIARRHEA: ICD-10-CM

## 2022-07-14 RX ORDER — ONDANSETRON 4 MG/1
4 TABLET, FILM COATED ORAL DAILY PRN
Qty: 30 TABLET | Refills: 0 | Status: SHIPPED
Start: 2022-07-14 | End: 2022-08-08

## 2022-07-14 NOTE — TELEPHONE ENCOUNTER
Pt called in for a refill on Zofran. She caught a bug from her 21 month old son. For the last 5 days she has experienced diarrhea and nausea. She used the last of her Zofran and would like a refill sent to [facility]. Denies fever, headache, body aches, and vomiting. Fells that she is on the \"take end\" of the virus.

## 2022-07-22 RX ORDER — CLINDAMYCIN PHOSPHATE 11.9 MG/ML
SOLUTION TOPICAL
Qty: 30 ML | Refills: 2 | Status: SHIPPED
Start: 2022-07-22 | End: 2022-08-08

## 2022-07-22 NOTE — TELEPHONE ENCOUNTER
Faxed FMLA Forms. Noticed that patient also requested a Rx for Clindamycin 1%. Can you send? \"I had one last question, the adepalene cream unfortunately makes my face very sensitive, so I was wondering if you wouldn't mind sending a refill to [facility] for the clindamyacin 1% solution. Our insurance won't cover the pledgets, but it will cover the solution entirely. Thank you very much for your time. \"

## 2022-08-11 PROBLEM — Z34.93 VERTEX PRESENTATION OF FETUS IN THIRD TRIMESTER: Status: RESOLVED | Noted: 2020-12-04 | Resolved: 2022-08-11

## 2022-08-11 PROBLEM — Z3A.39 39 WEEKS GESTATION OF PREGNANCY: Status: RESOLVED | Noted: 2020-10-21 | Resolved: 2022-08-11

## 2022-08-11 PROBLEM — O99.019 IRON DEFICIENCY ANEMIA OF PREGNANCY: Status: RESOLVED | Noted: 2020-11-20 | Resolved: 2022-08-11

## 2022-08-11 PROBLEM — D50.9 IRON DEFICIENCY ANEMIA OF PREGNANCY: Status: RESOLVED | Noted: 2020-11-20 | Resolved: 2022-08-11

## 2022-08-11 PROBLEM — Z34.83 MULTIGRAVIDA IN THIRD TRIMESTER: Status: RESOLVED | Noted: 2020-11-20 | Resolved: 2022-08-11

## 2022-08-11 PROBLEM — Z34.93 NORMAL PREGNANCY IN THIRD TRIMESTER: Status: RESOLVED | Noted: 2020-12-04 | Resolved: 2022-08-11

## 2022-10-14 ENCOUNTER — OFFICE VISIT (OUTPATIENT)
Dept: FAMILY MEDICINE CLINIC | Age: 27
End: 2022-10-14
Payer: COMMERCIAL

## 2022-10-14 VITALS
DIASTOLIC BLOOD PRESSURE: 72 MMHG | HEART RATE: 106 BPM | HEIGHT: 63 IN | TEMPERATURE: 98.7 F | OXYGEN SATURATION: 98 % | BODY MASS INDEX: 22.92 KG/M2 | SYSTOLIC BLOOD PRESSURE: 106 MMHG

## 2022-10-14 DIAGNOSIS — S93.491D SPRAIN OF ANTERIOR TALOFIBULAR LIGAMENT OF RIGHT ANKLE, SUBSEQUENT ENCOUNTER: Primary | ICD-10-CM

## 2022-10-14 PROCEDURE — 1036F TOBACCO NON-USER: CPT | Performed by: FAMILY MEDICINE

## 2022-10-14 PROCEDURE — G8484 FLU IMMUNIZE NO ADMIN: HCPCS | Performed by: FAMILY MEDICINE

## 2022-10-14 PROCEDURE — G8427 DOCREV CUR MEDS BY ELIG CLIN: HCPCS | Performed by: FAMILY MEDICINE

## 2022-10-14 PROCEDURE — G8420 CALC BMI NORM PARAMETERS: HCPCS | Performed by: FAMILY MEDICINE

## 2022-10-14 PROCEDURE — 99213 OFFICE O/P EST LOW 20 MIN: CPT | Performed by: FAMILY MEDICINE

## 2022-10-14 RX ORDER — IBUPROFEN 800 MG/1
800 TABLET ORAL EVERY 6 HOURS PRN
COMMUNITY

## 2022-10-14 NOTE — PROGRESS NOTES
[facility]  Office Progress Note - Dr. Wseley Mcclure  10/14/22    CC:   Chief Complaint   Patient presents with    Ankle Injury     Right ankle injury fell down some steps on 09/29. Went to urgent care same day and xrays said it was a sprain. Pt c/o pain and little ROM. /72   Pulse (!) 106   Temp 98.7 °F (37.1 °C) (Temporal)   Ht 5' 3\" (1.6 m)   SpO2 98%   BMI 22.92 kg/m²   Wt Readings from Last 3 Encounters:   08/08/22 129 lb 6.4 oz (58.7 kg)   06/30/21 135 lb 3.2 oz (61.3 kg)   05/10/21 138 lb (62.6 kg)       HPI: She missed a last step when going out of the garage about 2 weeks ago. Maybe twisted right ankle   Had urgent care that evening, Dx with bad sprain. Continues having difficulty putting weight on it and has limited ROM. She is in an aircast walking boot and she has a Scooter from a family member. When she is home, she hasnt been wearing the boot. Has kept it wrapped with an ACE wrap which is pain relieving. Putting weight on the joint really hurts, and occ feels unsteady. She has limited Dorsiflexion. _________________________________________________________    Assessment / Ruth Franklin was seen today for ankle injury. Diagnoses and all orders for this visit:    Sprain of anterior talofibular ligament of right ankle, subsequent encounter  -     XR ANKLE RIGHT (MIN 3 VIEWS); Future  -     XR FOOT RIGHT (MIN 3 VIEWS); Future  I think she likely has a bad sprain possible tear of the anterior talofibular ligament from forced inversion injury during a fall. Significant hematoma and bruising there. Limited range of motion likely secondary to stiffness from being in a walking boot for much of the time and not bearing weight recently. If x-rays do not reveal any occult fracture that was originally missed, she should start physical therapy. Encouraged range of motion exercises at home while she awaits next steps. As needed or as scheduled. Patient counseled to follow up sooner or seek more acute care if symptoms worsening or not improving according to plan. Electronically signed by Misael Flores MD on 10/14/2022    _________________________________________________________  Current Outpatient Medications on File Prior to Visit   Medication Sig Dispense Refill    ibuprofen (ADVIL;MOTRIN) 800 MG tablet Take 800 mg by mouth every 6 hours as needed for Pain       No current facility-administered medications on file prior to visit. Patient Active Problem List   Diagnosis Code    Maternal varicella, non-immune O09.899, Z28.39    Previous  delivery affecting pregnancy- Progressed to 8 cm. emergent PLTCS for Fetal bradycardia. History of abscess of C/S incision.  C/S 20 at 0930 if not delivered sooner O34.219    Patient declines vaginal birth after  section () O34.219    Group B Streptococcus carrier, +RV culture, currently pregnant O99.820     delivery, delivered, current hospitalization O80    Term birth of male  Z37.0    Nuchal cord without compression, delivered, current hospitalization O69.81X0    Postoperative anemia due to acute blood loss D62     _________________________________________________________  Past Medical History:   Diagnosis Date    Anemia     Chronic kidney disease     Depression     no medications    GERD (gastroesophageal reflux disease)     Heart abnormality     Hiatal hernia     PONV (postoperative nausea and vomiting)     Thyroid disease     Trauma     Ulcer        Family History   Problem Relation Age of Onset    Heart Attack Father 45        LAD    Diabetes Maternal Grandmother     Hypertension Maternal Grandfather        Past Surgical History:   Procedure Laterality Date     SECTION  2012     SECTION N/A 2020     SECTION performed by Sivakumar Green MD at [facility] OR    ENDOSCOPY, COLON, DIAGNOSTIC  2015    HYSTEROSCOPY 05/08/2018    D&C, polypectomy    INCISE AND DRAIN ABCESS  7/3/2012         UPPER GASTROINTESTINAL ENDOSCOPY  9/28/2015       Social History     Tobacco Use    Smoking status: Never    Smokeless tobacco: Never   Substance Use Topics    Alcohol use: No     Alcohol/week: 0.0 standard drinks    Drug use: No       Chart reviewed and updated where appropriate for PMH, Fam, and Soc Hx.  _________________________________________________________  ROS: POSITIVE: As in the HPI. Otherwise Pertinent negatives are negative.    __________________________________________________________  Physical Exam   Constitutional:    She is oriented to person, place, and time. She appears well-developed and well-nourished. She is ambulating using a knee scooter on the right lower extremity. Aircast walking boot up to the proximal shin. Ace wrap in place around the ankle on the right  Musculoskeletal:    Decreased dorsiflexion secondary to pain. Passive dorsiflexion stiff but near normal.  Plantar flexion normal.  Inversion and eversion causes some pain. Hematoma at the ATFL area with bruising surrounding. Tender to palpation over the lateral malleolus and proximal fifth metatarsal.    No joint swelling noted. No peripheral edema. Neurological:    She is A&Ox3    Motor and sensation grossly intact. Strength at the ankle decreased secondary to pain. Unable to test gait  Skin:    Skin is warm and dry. No rashes, No lesions. ________________________________________________________    This note may have been created using dictation software.  Efforts were made to reduce errors, but some may persist.

## 2022-10-17 DIAGNOSIS — S82.891D CLOSED AVULSION FRACTURE OF RIGHT ANKLE WITH ROUTINE HEALING, SUBSEQUENT ENCOUNTER: ICD-10-CM

## 2022-10-17 DIAGNOSIS — S93.401D SPRAIN OF RIGHT ANKLE, UNSPECIFIED LIGAMENT, SUBSEQUENT ENCOUNTER: Primary | ICD-10-CM

## 2022-10-18 ENCOUNTER — OFFICE VISIT (OUTPATIENT)
Dept: PODIATRY | Age: 27
End: 2022-10-18
Payer: COMMERCIAL

## 2022-10-18 VITALS — BODY MASS INDEX: 22.15 KG/M2 | HEIGHT: 63 IN | WEIGHT: 125 LBS

## 2022-10-18 DIAGNOSIS — S99.921A INJURY OF RIGHT FOOT, INITIAL ENCOUNTER: ICD-10-CM

## 2022-10-18 DIAGNOSIS — M25.571 ACUTE RIGHT ANKLE PAIN: Primary | ICD-10-CM

## 2022-10-18 DIAGNOSIS — S92.211A CLOSED DISPLACED FRACTURE OF CUBOID OF RIGHT FOOT, INITIAL ENCOUNTER: ICD-10-CM

## 2022-10-18 PROCEDURE — G8420 CALC BMI NORM PARAMETERS: HCPCS | Performed by: PODIATRIST

## 2022-10-18 PROCEDURE — 99203 OFFICE O/P NEW LOW 30 MIN: CPT | Performed by: PODIATRIST

## 2022-10-18 PROCEDURE — G8484 FLU IMMUNIZE NO ADMIN: HCPCS | Performed by: PODIATRIST

## 2022-10-18 PROCEDURE — 1036F TOBACCO NON-USER: CPT | Performed by: PODIATRIST

## 2022-10-18 PROCEDURE — G8427 DOCREV CUR MEDS BY ELIG CLIN: HCPCS | Performed by: PODIATRIST

## 2022-10-18 NOTE — PROGRESS NOTES
Patient here as a new patient for right foot/ankle pain. Patient states that her injury occurred while at home in her garage, fell and the right ankle twisted. Patient had instant pain when the injury occurred. Patient has previous ligament issues to the right foot. Patient is taking Ibuprofen 800mg when needed for pain. Electronically signed by Janette Rueda MA on 10/18/2022 at 10:38 AM    CC:    Injury right foot    HPI:   This pleasant 58-year-old female patient reporting today injury right foot and right ankle. Tenderness on the outside of the right foot. Denies any ankle pain today. No calf pain. Does have walking boot with her today. Has been taking ibuprofen which does help for some of the pain. Decreased swelling. Does present today with family member. No additional pedal complaints at this time. ROS:  Const: Denies constitutional symptoms  Musculo: Denies symptoms other than stated above  Skin: Denies symptoms other than stated above       Current Outpatient Medications:     ibuprofen (ADVIL;MOTRIN) 800 MG tablet, Take 800 mg by mouth every 6 hours as needed for Pain, Disp: , Rfl:   Allergies   Allergen Reactions    Iv Dye [Iodides] Other (See Comments)     Shut down kidneys, required hospitalization. Medrol [Methylprednisolone]      IV caused tingling to arms legs face    Percocet [Oxycodone-Acetaminophen] Nausea Only and Other (See Comments)     Jaundice, fever, chills and hallucinations       Past Medical History:   Diagnosis Date    Anemia     Chronic kidney disease     Depression     no medications    GERD (gastroesophageal reflux disease)     Heart abnormality     Hiatal hernia     PONV (postoperative nausea and vomiting)     Thyroid disease     Trauma     Ulcer            Vitals:    10/18/22 1038   Weight: 125 lb (56.7 kg)   Height: 5' 3\" (1.6 m)       Work History/Social History:    Foot and ankle history:     Focused Lower Extremity Physical Exam:    Neurovascular examination: Dorsalis Pedis palpable bilateral.  Posterior tibialis palpable bilateral.    Capillary Refill Time:  Immediate return  Hair growth:  Symmetrical and bilateral   Skin:  Not atrophic  Edema: Mild edema dorsal lateral right foot. Neurologic:  Light touch intact bilateral.      Musculoskeletal/ Orthopedic examination:    Equinis: Absent bilateral  Dorsiflexion, plantarflexion, inversion, eversion bilateral 5 out of 5 muscle strength  Wiggling toes  Negative Homans  There is tenderness overlying lateral aspect right foot overlying cuboid. Mild tenderness lateral calcaneus. No pain overlying fibula. Negative tib-fib squeeze test.  Negative Augustin    Dermatology examination:    No open skin lesions or abrasions bilateral lower extremity. Assessment and Plan:  Theresa was seen today for ankle injury. Diagnoses and all orders for this visit:    Acute right ankle pain      Radiographs right foot and right ankle from 10/14/2022 reviewed    1. Probable acute avulsion injuries of the lateral cuboid and lateral distal   calcaneus. 2.  Normal alignment of the ankle joint. Tibia and fibula appear intact. 1.  Probable acute avulsion injuries of the lateral cuboid and lateral distal   calcaneus. 2.  Normal alignment of the ankle joint. Tibia and fibula appear intact. New referral likely avulsion fracture cuboid and lateral calcaneus avulsion fracture. Did recommend Ace bandage during the day Cam walking boot. We did discuss gradual transition to weightbearing as tolerated over the next week. Any calf pain go to emergency room. Follow-up in office 2 weeks. Repeat weightbearing radiograph three-view right foot. No follow-ups on file. Seen By:  Vanessa Persaud DPM      Document was created using voice recognition software. Note was reviewed, however may contain grammatical errors.

## 2022-11-03 ENCOUNTER — OFFICE VISIT (OUTPATIENT)
Dept: PODIATRY | Age: 27
End: 2022-11-03
Payer: COMMERCIAL

## 2022-11-03 VITALS — HEIGHT: 63 IN | BODY MASS INDEX: 22.15 KG/M2 | WEIGHT: 125 LBS

## 2022-11-03 DIAGNOSIS — S92.211D CLOSED DISPLACED FRACTURE OF CUBOID OF RIGHT FOOT WITH ROUTINE HEALING, SUBSEQUENT ENCOUNTER: Primary | ICD-10-CM

## 2022-11-03 DIAGNOSIS — S99.921D INJURY OF RIGHT FOOT, SUBSEQUENT ENCOUNTER: ICD-10-CM

## 2022-11-03 PROCEDURE — 99213 OFFICE O/P EST LOW 20 MIN: CPT | Performed by: PODIATRIST

## 2022-11-03 PROCEDURE — G8427 DOCREV CUR MEDS BY ELIG CLIN: HCPCS | Performed by: PODIATRIST

## 2022-11-03 PROCEDURE — G8484 FLU IMMUNIZE NO ADMIN: HCPCS | Performed by: PODIATRIST

## 2022-11-03 PROCEDURE — G8420 CALC BMI NORM PARAMETERS: HCPCS | Performed by: PODIATRIST

## 2022-11-03 PROCEDURE — 1036F TOBACCO NON-USER: CPT | Performed by: PODIATRIST

## 2022-11-03 NOTE — PROGRESS NOTES
Patient in office to follow up with right foot injury Currently using crutches. Not wearing boot due to it being uncomfortable. Xrays obtained prior to apt. CC:    Follow-up right foot injury    HPI:   Presents with family member for follow-up right foot injury. No calf pain. Does not have a boot with her today. States she has been using crutches which do seem to help. Did have radiographs right foot. Decreased overall swelling. States she has not been using the Ace bandage as much during the day. Denies any recent injuries. No open skin lesions. No additional pedal complaints. Does notice improvement with pain since last visit. ROS:  Const: Denies constitutional symptoms  Musculo: Denies symptoms other than stated above  Skin: Denies symptoms other than stated above       Current Outpatient Medications:     ibuprofen (ADVIL;MOTRIN) 800 MG tablet, Take 800 mg by mouth every 6 hours as needed for Pain, Disp: , Rfl:   Allergies   Allergen Reactions    Iv Dye [Iodides] Other (See Comments)     Shut down kidneys, required hospitalization. Medrol [Methylprednisolone]      IV caused tingling to arms legs face    Percocet [Oxycodone-Acetaminophen] Nausea Only and Other (See Comments)     Jaundice, fever, chills and hallucinations       Past Medical History:   Diagnosis Date    Anemia     Chronic kidney disease     Depression     no medications    GERD (gastroesophageal reflux disease)     Heart abnormality     Hiatal hernia     PONV (postoperative nausea and vomiting)     Thyroid disease     Trauma     Ulcer            Vitals:    11/03/22 1146   Weight: 125 lb (56.7 kg)   Height: 5' 3\" (1.6 m)       Work History/Social History:    Foot and ankle history:     Focused Lower Extremity Physical Exam:    Neurovascular examination:    Dorsalis Pedis palpable bilateral.  Posterior tibialis palpable bilateral.    Capillary Refill Time:  Immediate return  Hair growth:  Symmetrical and bilateral   Skin:  Not atrophic  Edema: Mild ecchymosis dorsal right foot. Decreased overall edema. No erythema. No open wounds. Neurologic:  Light touch intact bilateral.      Musculoskeletal/ Orthopedic examination:    Equinis: Absent bilateral  Dorsiflexion, plantarflexion, inversion, eversion bilateral 5 out of 5 muscle strength  Wiggling toes  Negative Homans  Negative calcaneal squeeze test.  Mild tenderness overlying lateral cuboid. Improving. No pain inversion eversion right foot. No pain dorsiflexion plantarflexion right ankle joint. Dermatology examination:    No erythema. There is mild ecchymosis dorsal right foot. No significant edema today. Assessment and Plan:  Theresa was seen today for follow-up and foot pain. Diagnoses and all orders for this visit:    Closed displaced fracture of cuboid of right foot with routine healing, subsequent encounter    Injury of right foot, subsequent encounter    Other orders  -     XR FOOT RIGHT (MIN 3 VIEWS); Future    Radiographs right foot and right ankle from 10/14/2022 reviewed    1. Probable acute avulsion injuries of the lateral cuboid and lateral distal   calcaneus. 2.  Normal alignment of the ankle joint. Tibia and fibula appear intact. 1.  Probable acute avulsion injuries of the lateral cuboid and lateral distal   calcaneus. 2.  Normal alignment of the ankle joint. Tibia and fibula appear intact. Initial injury sustained 9/29/2022  New radiographs right foot reviewed. Healing noted at anterior process calcaneus. Healing noted lateral cuboid. I would recommend Cam walking boot with weightbearing as tolerated. Ace bandage during the day remove boot at night any calf pain go to emergency room. Approximately 5 weeks from initial injury. Follow-up 3 weeks. Will be approximately 8 weeks from initial injury. Transition to regular shoe with repeat weightbearing radiograph three-view right foot at that time likely.       Return in about 3 weeks (around 11/24/2022). Seen By:  Sharolyn Goldmann, DPM      Document was created using voice recognition software. Note was reviewed, however may contain grammatical errors.

## 2022-11-25 ENCOUNTER — OFFICE VISIT (OUTPATIENT)
Dept: PODIATRY | Age: 27
End: 2022-11-25
Payer: COMMERCIAL

## 2022-11-25 VITALS — WEIGHT: 125 LBS | BODY MASS INDEX: 22.15 KG/M2 | HEIGHT: 63 IN

## 2022-11-25 DIAGNOSIS — S92.211D CLOSED DISPLACED FRACTURE OF CUBOID OF RIGHT FOOT WITH ROUTINE HEALING, SUBSEQUENT ENCOUNTER: Primary | ICD-10-CM

## 2022-11-25 PROCEDURE — G8420 CALC BMI NORM PARAMETERS: HCPCS | Performed by: PODIATRIST

## 2022-11-25 PROCEDURE — G8484 FLU IMMUNIZE NO ADMIN: HCPCS | Performed by: PODIATRIST

## 2022-11-25 PROCEDURE — 99213 OFFICE O/P EST LOW 20 MIN: CPT | Performed by: PODIATRIST

## 2022-11-25 PROCEDURE — 1036F TOBACCO NON-USER: CPT | Performed by: PODIATRIST

## 2022-11-25 PROCEDURE — G8427 DOCREV CUR MEDS BY ELIG CLIN: HCPCS | Performed by: PODIATRIST

## 2022-11-25 NOTE — PROGRESS NOTES
Patient in office to follow up with right foot injury. Currently wearing walking boot. Xrays obtained prior to apt. CC:    Follow-up right cuboid fracture    HPI:   Presents today follow-up right cuboid fracture. Does have a walking boot with her today. Decreased swelling decreased pain. Progressing well. No calf pain. Repeat radiographs obtained today. ROS:  Const: Denies constitutional symptoms  Musculo: Denies symptoms other than stated above  Skin: Denies symptoms other than stated above       Current Outpatient Medications:     ibuprofen (ADVIL;MOTRIN) 800 MG tablet, Take 800 mg by mouth every 6 hours as needed for Pain, Disp: , Rfl:   Allergies   Allergen Reactions    Iv Dye [Iodides] Other (See Comments)     Shut down kidneys, required hospitalization. Medrol [Methylprednisolone]      IV caused tingling to arms legs face    Percocet [Oxycodone-Acetaminophen] Nausea Only and Other (See Comments)     Jaundice, fever, chills and hallucinations       Past Medical History:   Diagnosis Date    Anemia     Chronic kidney disease     Depression     no medications    GERD (gastroesophageal reflux disease)     Heart abnormality     Hiatal hernia     PONV (postoperative nausea and vomiting)     Thyroid disease     Trauma     Ulcer            Vitals:    11/25/22 1038   Weight: 125 lb (56.7 kg)   Height: 5' 3\" (1.6 m)       Work History/Social History: Foot and ankle history:     Focused Lower Extremity Physical Exam:    Neurovascular examination:    Dorsalis Pedis palpable bilateral.  Posterior tibialis palpable bilateral.    Capillary Refill Time:  Immediate return  Hair growth:  Symmetrical and bilateral   Skin:  Not atrophic  Edema: No ecchymosis or erythema right foot.   Neurologic:  Light touch intact bilateral.      Musculoskeletal/ Orthopedic examination:    Equinis: Absent bilateral  Dorsiflexion, plantarflexion, inversion, eversion bilateral 5 out of 5 muscle strength  Wiggling toes  Negative Homans  No significant pain overlying cuboid today. Progressing well. No pain with inversion eversion subtalar joint right. No crepitus or tenderness with dorsiflexion or plantarflexion right ankle joint. Dermatology examination:    No edema no erythema    Assessment and Plan:  Jairo Figueroa was seen today for follow-up and foot injury. Diagnoses and all orders for this visit:    Closed displaced fracture of cuboid of right foot with routine healing, subsequent encounter  -     XR FOOT RIGHT (MIN 3 VIEWS); Future    Radiographs right foot and right ankle from 10/14/2022 reviewed    1. Probable acute avulsion injuries of the lateral cuboid and lateral distal   calcaneus. 2.  Normal alignment of the ankle joint. Tibia and fibula appear intact. 1.  Probable acute avulsion injuries of the lateral cuboid and lateral distal   calcaneus. 2.  Normal alignment of the ankle joint. Tibia and fibula appear intact. Initial injury sustained 9/29/2022      8-week follow-up from initial injury right foot  New radiographs right foot reviewed today    Interval decrease in density of the avulsion fragments identified laterally   in the right foot. No new or acute process. Progressing very well. Healing noted on radiographs. Gradual transition from Cam walking boot to wide well supported walking shoe in the next week. We did discuss over-the-counter ankle supported wrap versus close toed low-dose compression stockings during the day. Remove at night any calf pain go to emergency room. I will follow-up in 6 weeks repeat weightbearing radiographs right foot at that time. Return in about 6 weeks (around 1/6/2023). Seen By:  Jonathan Castillo DPM      Document was created using voice recognition software. Note was reviewed, however may contain grammatical errors.

## 2022-12-02 ENCOUNTER — PATIENT MESSAGE (OUTPATIENT)
Dept: FAMILY MEDICINE CLINIC | Age: 27
End: 2022-12-02

## 2022-12-02 RX ORDER — ONDANSETRON 4 MG/1
4 TABLET, FILM COATED ORAL EVERY 8 HOURS PRN
Qty: 30 TABLET | Refills: 3 | Status: SHIPPED | OUTPATIENT
Start: 2022-12-02 | End: 2022-12-07

## 2022-12-16 ENCOUNTER — OFFICE VISIT (OUTPATIENT)
Dept: FAMILY MEDICINE CLINIC | Age: 27
End: 2022-12-16
Payer: COMMERCIAL

## 2022-12-16 VITALS
BODY MASS INDEX: 23.39 KG/M2 | OXYGEN SATURATION: 100 % | DIASTOLIC BLOOD PRESSURE: 64 MMHG | HEIGHT: 63 IN | HEART RATE: 75 BPM | WEIGHT: 132 LBS | SYSTOLIC BLOOD PRESSURE: 114 MMHG | TEMPERATURE: 98.8 F

## 2022-12-16 DIAGNOSIS — L40.9 PSORIASIS: ICD-10-CM

## 2022-12-16 DIAGNOSIS — H65.93 BILATERAL OTITIS MEDIA WITH EFFUSION: ICD-10-CM

## 2022-12-16 DIAGNOSIS — R00.2 PALPITATIONS: Primary | ICD-10-CM

## 2022-12-16 PROCEDURE — 1036F TOBACCO NON-USER: CPT | Performed by: FAMILY MEDICINE

## 2022-12-16 PROCEDURE — G8420 CALC BMI NORM PARAMETERS: HCPCS | Performed by: FAMILY MEDICINE

## 2022-12-16 PROCEDURE — 99214 OFFICE O/P EST MOD 30 MIN: CPT | Performed by: FAMILY MEDICINE

## 2022-12-16 PROCEDURE — G8427 DOCREV CUR MEDS BY ELIG CLIN: HCPCS | Performed by: FAMILY MEDICINE

## 2022-12-16 PROCEDURE — G8484 FLU IMMUNIZE NO ADMIN: HCPCS | Performed by: FAMILY MEDICINE

## 2022-12-16 RX ORDER — CLOBETASOL PROPIONATE 0.5 MG/G
CREAM TOPICAL
Qty: 45 G | Refills: 1 | Status: SHIPPED | OUTPATIENT
Start: 2022-12-16

## 2022-12-16 RX ORDER — ADAPALENE 0.1 G/100G
CREAM TOPICAL NIGHTLY
COMMUNITY

## 2022-12-16 ASSESSMENT — PATIENT HEALTH QUESTIONNAIRE - PHQ9
SUM OF ALL RESPONSES TO PHQ QUESTIONS 1-9: 0
SUM OF ALL RESPONSES TO PHQ9 QUESTIONS 1 & 2: 0
SUM OF ALL RESPONSES TO PHQ QUESTIONS 1-9: 0
SUM OF ALL RESPONSES TO PHQ QUESTIONS 1-9: 0
1. LITTLE INTEREST OR PLEASURE IN DOING THINGS: 0
2. FEELING DOWN, DEPRESSED OR HOPELESS: 0
SUM OF ALL RESPONSES TO PHQ QUESTIONS 1-9: 0

## 2022-12-16 NOTE — PROGRESS NOTES
[facility]  Office Progress Note - Dr. Donny Kaur  12/16/22    CC:   Chief Complaint   Patient presents with    Ear Problem     Pt inquires about getting getting ears flushed. /64   Pulse 75   Temp 98.8 °F (37.1 °C) (Temporal)   Ht 5' 3\" (1.6 m)   Wt 132 lb (59.9 kg)   SpO2 100%   BMI 23.38 kg/m²   Wt Readings from Last 3 Encounters:   12/16/22 132 lb (59.9 kg)   11/25/22 125 lb (56.7 kg)   11/03/22 125 lb (56.7 kg)       HPI: Patient presents for follow-up on a few medical conditions. Overall she has been feeling well recently. Her psoriasis has been under fairly good control. It always worsens in the winter. She is not currently treating with anything. Areas involved include her hands and elbows, forearm, scalp, gluteal cleft. There is mild involvement of those areas. She has noted palpitations on and off, usually only occurring about twice a year lasting for maybe 30 seconds. She never feels unwell during this visit feels like her heart is \"flopping like a fish. \"  No presyncopal symptoms, chest pain, major tachycardia etc.  She cannot relate it with any specific activity. She notes that her father had an arrhythmia. She had a significant episode of postpartum depression last year, but has improved significantly and has not been taking any medication for over 6 months or so. She feels like this is nearly resolved. She does request that FMLA be extended in case it would come back, but she has been doing well lately. She feels like her ears are full and suspected that her canals may be full of wax. On exam they are essentially clear. Suspect she has bilateral otitis media with effusion. At times she feels like she is underwater or it is difficult hearing. Feels pressure near the angle of the mandible bilaterally. .  _________________________________________________________    Assessment / Nay Boxer was seen today for ear problem.     Diagnoses and all orders for this visit:    Palpitations  -     CBC with Auto Differential; Future  -     Comprehensive Metabolic Panel; Future  -     TSH; Future  Sounds like very infrequent possibly ectopic beats. Given that she does not feel any apprehension or presyncopal symptoms during the brief episodes I think we can monitor this for now. Holter monitor is not likely to catch it given how infrequent it is happening. We will check a couple of labs to assure that the above is in order. Bilateral otitis media with effusion  No cerumen impaction. Postpartum depression  Majorly improved to resolved. I will refill FMLA paperwork in case this would recur, but I do not expect that over the next 6 months. She would have recurrence in the future, I would be happy to refill those after this next set of FMLA runs out. Psoriasis  -     clobetasol (TEMOVATE) 0.05 % cream; Apply topically 1-2 times daily for 7-10 days at most, then 2 weeks off. Do not apply to face. Okay to use clobetasol cream as above on problem areas but avoid use on the face and scalp. Counseled about hypopigmentation and thinning of the skin with persistent use. Follow-up as needed or in 1 year or as scheduled. Patient counseled to follow up sooner or seek more acute care if symptoms worsening or not improving according to plan. Electronically signed by Rad Napoles MD on 2022    _________________________________________________________  Current Outpatient Medications on File Prior to Visit   Medication Sig Dispense Refill    adapalene (DIFFERIN) 0.1 % cream Apply topically nightly Apply topically nightly. 22 Pt reports using every other day. No current facility-administered medications on file prior to visit. Patient Active Problem List   Diagnosis Code    Maternal varicella, non-immune O09.899, Z28.39    Previous  delivery affecting pregnancy- Progressed to 8 cm. emergent PLTCS for Fetal bradycardia. History of abscess of C/S incision. C/S 20 at 0930 if not delivered sooner O34.219    Patient declines vaginal birth after  section () O34.219    Group B Streptococcus carrier, +RV culture, currently pregnant O99.820     delivery, delivered, current hospitalization O80    Term birth of male  Z37.0    Nuchal cord without compression, delivered, current hospitalization O69.81X0    Postoperative anemia due to acute blood loss D62     _________________________________________________________  Past Medical History:   Diagnosis Date    Anemia     Chronic kidney disease     Depression     no medications    GERD (gastroesophageal reflux disease)     Heart abnormality     Hiatal hernia     PONV (postoperative nausea and vomiting)     Thyroid disease     Trauma     Ulcer        Family History   Problem Relation Age of Onset    Heart Attack Father 45        LAD    Diabetes Maternal Grandmother     Hypertension Maternal Grandfather        Past Surgical History:   Procedure Laterality Date     SECTION  2012     SECTION N/A 2020     SECTION performed by Miguel Wise MD at [facility] OR    ENDOSCOPY, COLON, DIAGNOSTIC  2015    HYSTEROSCOPY  2018    D&C, polypectomy    INCISE AND DRAIN ABCESS  7/3/2012         UPPER GASTROINTESTINAL ENDOSCOPY  2015       Social History     Tobacco Use    Smoking status: Never    Smokeless tobacco: Never   Substance Use Topics    Alcohol use: No     Alcohol/week: 0.0 standard drinks    Drug use: No       Chart reviewed and updated where appropriate for PMH, Fam, and Soc Hx.  _________________________________________________________  ROS: POSITIVE: As in the HPI. Otherwise Pertinent negatives are negative.    __________________________________________________________  Physical Exam   Constitutional:    He is oriented to person, place, and time. He appears well-developed and well-nourished.    HENT:    Right Ear: normal pinna, normal canal, normal TM. Left Ear: normal pinna, normal canal, normal TM. Nose: Nose normal.    Mouth/Throat: Oropharynx is clear and moist.   Eyes:    Conjunctivae are normal.    Pupils are equal, round, and reactive to light. EOMI. Neck:    Normal range of motion. No thyromegaly or nodules noted. No bruit. No LAD. Cardiovascular:    Normal rate, regular rhythm and normal heart sounds. No murmur. No gallop and no friction rub. Pulmonary/Chest:    Effort normal and breath sounds normal.    No wheezes. No rales or rhonchi. Abdominal:    Soft. Bowel sounds are normal.    No distension. No tenderness. Musculoskeletal:    Normal range of motion. No joint swelling noted. No peripheral edema. Skin:    Skin is warm and dry. No rashes, lesions. Patches of psoriasis and plaques as described in the HPI  Psychiatric:    He has a normal mood and affect. Normal groom and dress. No SI or HI.   ________________________________________________________    This note may have been created using dictation software.  Efforts were made to reduce errors, but some may persist.

## 2023-01-24 ENCOUNTER — OFFICE VISIT (OUTPATIENT)
Dept: PODIATRY | Age: 28
End: 2023-01-24
Payer: COMMERCIAL

## 2023-01-24 VITALS — HEIGHT: 63 IN | WEIGHT: 132 LBS | BODY MASS INDEX: 23.39 KG/M2

## 2023-01-24 DIAGNOSIS — S92.211D CLOSED DISPLACED FRACTURE OF CUBOID OF RIGHT FOOT WITH ROUTINE HEALING, SUBSEQUENT ENCOUNTER: Primary | ICD-10-CM

## 2023-01-24 PROCEDURE — G8427 DOCREV CUR MEDS BY ELIG CLIN: HCPCS | Performed by: PODIATRIST

## 2023-01-24 PROCEDURE — G8420 CALC BMI NORM PARAMETERS: HCPCS | Performed by: PODIATRIST

## 2023-01-24 PROCEDURE — 99213 OFFICE O/P EST LOW 20 MIN: CPT | Performed by: PODIATRIST

## 2023-01-24 PROCEDURE — 1036F TOBACCO NON-USER: CPT | Performed by: PODIATRIST

## 2023-01-24 PROCEDURE — G8484 FLU IMMUNIZE NO ADMIN: HCPCS | Performed by: PODIATRIST

## 2023-01-24 NOTE — PROGRESS NOTES
Patient in office to follow up with right foot injury. X-rays obtained prior to visit today. CC:    Follow-up cuboid fracture right foot    HPI:   Presents today follow-up cuboid fracture left foot. Pain-free right foot today. Repeat radiographs right foot obtained today. Does present today wearing regular shoes and has been pain-free with no swelling. No additional pedal complaints today. ROS:  Const: Denies constitutional symptoms  Musculo: Denies symptoms other than stated above  Skin: Denies symptoms other than stated above       Current Outpatient Medications:     adapalene (DIFFERIN) 0.1 % cream, Apply topically nightly Apply topically nightly. 12/16/22 Pt reports using every other day., Disp: , Rfl:     clobetasol (TEMOVATE) 0.05 % cream, Apply topically 1-2 times daily for 7-10 days at most, then 2 weeks off. Do not apply to face., Disp: 45 g, Rfl: 1  Allergies   Allergen Reactions    Iv Dye [Iodides] Other (See Comments)     Shut down kidneys, required hospitalization. Medrol [Methylprednisolone]      IV caused tingling to arms legs face    Percocet [Oxycodone-Acetaminophen] Nausea Only and Other (See Comments)     Jaundice, fever, chills and hallucinations       Past Medical History:   Diagnosis Date    Anemia     Chronic kidney disease     Depression     no medications    GERD (gastroesophageal reflux disease)     Heart abnormality     Hiatal hernia     PONV (postoperative nausea and vomiting)     Thyroid disease     Trauma     Ulcer            Vitals:    01/24/23 1423   Weight: 132 lb (59.9 kg)   Height: 5' 3\" (1.6 m)       Work History/Social History:    Foot and ankle history:     Focused Lower Extremity Physical Exam:    Neurovascular examination:    Dorsalis Pedis palpable bilateral.  Posterior tibialis palpable bilateral.    Capillary Refill Time:  Immediate return  Hair growth:  Symmetrical and bilateral   Skin:  Not atrophic  Edema: No edema foot or ankle  Neurologic:  Light touch intact bilateral.      Musculoskeletal/ Orthopedic examination:    Equinis: Absent bilateral  Dorsiflexion, plantarflexion, inversion, eversion bilateral 5 out of 5 muscle strength  Wiggling toes  Negative Homans  No pain overlying cuboid right foot. No pain inversion eversion right foot. Dermatology examination:    No edema or ecchymosis right foot. Assessment and Plan:  Diagnoses and all orders for this visit:    Closed displaced fracture of cuboid of right foot with routine healing, subsequent encounter  -     XR FOOT RIGHT (MIN 3 VIEWS); Future      1. Probable acute avulsion injuries of the lateral cuboid and lateral distal   calcaneus. 2.  Normal alignment of the ankle joint. Tibia and fibula appear intact. 1.  Probable acute avulsion injuries of the lateral cuboid and lateral distal   calcaneus. 2.  Normal alignment of the ankle joint. Tibia and fibula appear intact. Interval decrease in density of the avulsion fragments identified laterally   in the right foot. No new or acute process. Follow-up cuboid fracture right foot. Pain-free today progressing very well. Presents with regular shoes. New radiographs reviewed today. No swelling no pain. We did discuss and review radiographs in office today. She will call for follow-up as needed. Return if symptoms worsen or fail to improve. Seen By:  Lefty Strong DPM      Document was created using voice recognition software. Note was reviewed, however may contain grammatical errors.

## 2023-01-31 ENCOUNTER — PATIENT MESSAGE (OUTPATIENT)
Dept: FAMILY MEDICINE CLINIC | Age: 28
End: 2023-01-31

## 2023-01-31 NOTE — TELEPHONE ENCOUNTER
From: Cecy Cherry  To: Dr. Marlo Capone  Sent: 1/31/2023 1:31 PM EST  Subject: Lamisil prescription     Tevin Vanegas,     I was just wondering if you could add the blood work that I would need for a Lamisil prescription to my lab work that is scheduled, and then I will get it drawn this week. We had a cold that hung on forever and I didn't want to pass it along. Thank you.

## 2023-03-08 ENCOUNTER — PATIENT MESSAGE (OUTPATIENT)
Dept: FAMILY MEDICINE CLINIC | Age: 28
End: 2023-03-08

## 2023-03-09 NOTE — TELEPHONE ENCOUNTER
From: Isael Rojas  To: Dr. Tiera Serrano  Sent: 3/8/2023 6:37 PM EST  Subject: Celexa prescription     Hi Dr. Cecilia Salazar, I hope you're doing well. I'm not doing the best handling depression on my own and I feel it's bad enough I'm open to trying a medication. Citalopram was a little hard on my stomach, but at this point my quality of life is pretty low, so the side effects might just have to be tolerated. My  has been urging me to ask about Sadia Case because I've not felt well enough to work out in a long time and I just feel unlike myself. Thanks for your time.      Theresa

## 2023-03-10 RX ORDER — BUPROPION HYDROCHLORIDE 150 MG/1
150 TABLET ORAL EVERY MORNING
Qty: 30 TABLET | Refills: 3 | Status: SHIPPED | OUTPATIENT
Start: 2023-03-10

## 2023-05-04 DIAGNOSIS — R00.2 PALPITATIONS: ICD-10-CM

## 2023-05-04 LAB
ACANTHOCYTES: ABNORMAL
ALBUMIN SERPL-MCNC: 4.6 G/DL (ref 3.5–5.2)
ALP SERPL-CCNC: 45 U/L (ref 35–104)
ALT SERPL-CCNC: 6 U/L (ref 0–32)
ANION GAP SERPL CALCULATED.3IONS-SCNC: 11 MMOL/L (ref 7–16)
ANISOCYTOSIS: ABNORMAL
AST SERPL-CCNC: 17 U/L (ref 0–31)
BASOPHILS # BLD: 0 E9/L (ref 0–0.2)
BASOPHILS NFR BLD: 0.4 % (ref 0–2)
BILIRUB SERPL-MCNC: 0.7 MG/DL (ref 0–1.2)
BUN SERPL-MCNC: 8 MG/DL (ref 6–20)
BURR CELLS: ABNORMAL
CALCIUM SERPL-MCNC: 9.6 MG/DL (ref 8.6–10.2)
CHLORIDE SERPL-SCNC: 103 MMOL/L (ref 98–107)
CO2 SERPL-SCNC: 25 MMOL/L (ref 22–29)
CREAT SERPL-MCNC: 0.6 MG/DL (ref 0.5–1)
EOSINOPHIL # BLD: 0.12 E9/L (ref 0.05–0.5)
EOSINOPHIL NFR BLD: 2.6 % (ref 0–6)
ERYTHROCYTE [DISTWIDTH] IN BLOOD BY AUTOMATED COUNT: 17.9 FL (ref 11.5–15)
GLUCOSE SERPL-MCNC: 66 MG/DL (ref 74–99)
HCT VFR BLD AUTO: 34 % (ref 34–48)
HGB BLD-MCNC: 9.5 G/DL (ref 11.5–15.5)
LYMPHOCYTES # BLD: 1.1 E9/L (ref 1.5–4)
LYMPHOCYTES NFR BLD: 22.8 % (ref 20–42)
MCH RBC QN AUTO: 21.6 PG (ref 26–35)
MCHC RBC AUTO-ENTMCNC: 27.9 % (ref 32–34.5)
MCV RBC AUTO: 77.3 FL (ref 80–99.9)
MONOCYTES # BLD: 0.24 E9/L (ref 0.1–0.95)
MONOCYTES NFR BLD: 5.3 % (ref 2–12)
NEUTROPHILS # BLD: 3.31 E9/L (ref 1.8–7.3)
NEUTS SEG NFR BLD: 69.3 % (ref 43–80)
NRBC BLD-RTO: 0.9 /100 WBC
OVALOCYTES: ABNORMAL
PLATELET # BLD AUTO: 277 E9/L (ref 130–450)
PMV BLD AUTO: 10.6 FL (ref 7–12)
POIKILOCYTES: ABNORMAL
POTASSIUM SERPL-SCNC: 3.8 MMOL/L (ref 3.5–5)
PROT SERPL-MCNC: 7.3 G/DL (ref 6.4–8.3)
RBC # BLD AUTO: 4.4 E12/L (ref 3.5–5.5)
SODIUM SERPL-SCNC: 139 MMOL/L (ref 132–146)
TEAR DROP CELLS: ABNORMAL
TSH SERPL-MCNC: 0.36 UIU/ML (ref 0.27–4.2)
WBC # BLD: 4.8 E9/L (ref 4.5–11.5)

## 2023-05-12 ENCOUNTER — OFFICE VISIT (OUTPATIENT)
Dept: FAMILY MEDICINE CLINIC | Age: 28
End: 2023-05-12
Payer: COMMERCIAL

## 2023-05-12 VITALS
WEIGHT: 127 LBS | HEART RATE: 77 BPM | BODY MASS INDEX: 22.5 KG/M2 | SYSTOLIC BLOOD PRESSURE: 110 MMHG | DIASTOLIC BLOOD PRESSURE: 64 MMHG | HEIGHT: 63 IN | OXYGEN SATURATION: 100 % | TEMPERATURE: 99 F

## 2023-05-12 DIAGNOSIS — E16.2 HYPOGLYCEMIA: ICD-10-CM

## 2023-05-12 DIAGNOSIS — D50.9 MICROCYTIC ANEMIA: Primary | ICD-10-CM

## 2023-05-12 PROCEDURE — G8420 CALC BMI NORM PARAMETERS: HCPCS | Performed by: FAMILY MEDICINE

## 2023-05-12 PROCEDURE — 99213 OFFICE O/P EST LOW 20 MIN: CPT | Performed by: FAMILY MEDICINE

## 2023-05-12 PROCEDURE — 1036F TOBACCO NON-USER: CPT | Performed by: FAMILY MEDICINE

## 2023-05-12 PROCEDURE — G8427 DOCREV CUR MEDS BY ELIG CLIN: HCPCS | Performed by: FAMILY MEDICINE

## 2023-05-12 RX ORDER — FERROUS SULFATE 325(65) MG
325 TABLET ORAL
Qty: 30 TABLET | Refills: 3 | Status: SHIPPED | OUTPATIENT
Start: 2023-05-12

## 2023-05-12 ASSESSMENT — PATIENT HEALTH QUESTIONNAIRE - PHQ9
10. IF YOU CHECKED OFF ANY PROBLEMS, HOW DIFFICULT HAVE THESE PROBLEMS MADE IT FOR YOU TO DO YOUR WORK, TAKE CARE OF THINGS AT HOME, OR GET ALONG WITH OTHER PEOPLE: 1
4. FEELING TIRED OR HAVING LITTLE ENERGY: 1
9. THOUGHTS THAT YOU WOULD BE BETTER OFF DEAD, OR OF HURTING YOURSELF: 0
5. POOR APPETITE OR OVEREATING: 0
SUM OF ALL RESPONSES TO PHQ QUESTIONS 1-9: 1
SUM OF ALL RESPONSES TO PHQ QUESTIONS 1-9: 1
SUM OF ALL RESPONSES TO PHQ9 QUESTIONS 1 & 2: 0
8. MOVING OR SPEAKING SO SLOWLY THAT OTHER PEOPLE COULD HAVE NOTICED. OR THE OPPOSITE, BEING SO FIGETY OR RESTLESS THAT YOU HAVE BEEN MOVING AROUND A LOT MORE THAN USUAL: 0
1. LITTLE INTEREST OR PLEASURE IN DOING THINGS: 0
2. FEELING DOWN, DEPRESSED OR HOPELESS: 0
SUM OF ALL RESPONSES TO PHQ QUESTIONS 1-9: 1
7. TROUBLE CONCENTRATING ON THINGS, SUCH AS READING THE NEWSPAPER OR WATCHING TELEVISION: 0
6. FEELING BAD ABOUT YOURSELF - OR THAT YOU ARE A FAILURE OR HAVE LET YOURSELF OR YOUR FAMILY DOWN: 0
3. TROUBLE FALLING OR STAYING ASLEEP: 0
SUM OF ALL RESPONSES TO PHQ QUESTIONS 1-9: 1

## 2023-05-19 ENCOUNTER — TELEPHONE (OUTPATIENT)
Dept: FAMILY MEDICINE CLINIC | Age: 28
End: 2023-05-19

## 2023-07-03 DIAGNOSIS — E16.2 HYPOGLYCEMIA: ICD-10-CM

## 2023-07-03 DIAGNOSIS — B37.31 RECURRENT CANDIDIASIS OF VAGINA: ICD-10-CM

## 2023-07-03 DIAGNOSIS — D50.9 MICROCYTIC ANEMIA: ICD-10-CM

## 2023-07-03 LAB
ALBUMIN SERPL-MCNC: 4.6 G/DL (ref 3.5–5.2)
ALP SERPL-CCNC: 49 U/L (ref 35–104)
ALT SERPL-CCNC: 6 U/L (ref 0–32)
ANION GAP SERPL CALCULATED.3IONS-SCNC: 13 MMOL/L (ref 7–16)
ANISOCYTOSIS: ABNORMAL
AST SERPL-CCNC: 20 U/L (ref 0–31)
BASOPHILS # BLD: 0.03 E9/L (ref 0–0.2)
BASOPHILS NFR BLD: 0.8 % (ref 0–2)
BILIRUB SERPL-MCNC: 0.8 MG/DL (ref 0–1.2)
BUN SERPL-MCNC: 7 MG/DL (ref 6–20)
BURR CELLS: ABNORMAL
CALCIUM SERPL-MCNC: 9.3 MG/DL (ref 8.6–10.2)
CHLORIDE SERPL-SCNC: 103 MMOL/L (ref 98–107)
CO2 SERPL-SCNC: 24 MMOL/L (ref 22–29)
CREAT SERPL-MCNC: 0.6 MG/DL (ref 0.5–1)
EOSINOPHIL # BLD: 0.16 E9/L (ref 0.05–0.5)
EOSINOPHIL NFR BLD: 4.3 % (ref 0–6)
ERYTHROCYTE [DISTWIDTH] IN BLOOD BY AUTOMATED COUNT: 22.6 FL (ref 11.5–15)
GLUCOSE SERPL-MCNC: 70 MG/DL (ref 74–99)
HCT VFR BLD AUTO: 38.5 % (ref 34–48)
HGB BLD-MCNC: 11.1 G/DL (ref 11.5–15.5)
HYPOCHROMIA: ABNORMAL
IMM GRANULOCYTES # BLD: 0.01 E9/L
IMM GRANULOCYTES NFR BLD: 0.3 % (ref 0–5)
LYMPHOCYTES # BLD: 1.1 E9/L (ref 1.5–4)
LYMPHOCYTES NFR BLD: 29.4 % (ref 20–42)
MCH RBC QN AUTO: 24.3 PG (ref 26–35)
MCHC RBC AUTO-ENTMCNC: 28.8 % (ref 32–34.5)
MCV RBC AUTO: 84.4 FL (ref 80–99.9)
MONOCYTES # BLD: 0.33 E9/L (ref 0.1–0.95)
MONOCYTES NFR BLD: 8.8 % (ref 2–12)
NEUTROPHILS # BLD: 2.11 E9/L (ref 1.8–7.3)
NEUTS SEG NFR BLD: 56.4 % (ref 43–80)
OVALOCYTES: ABNORMAL
PLATELET # BLD AUTO: 284 E9/L (ref 130–450)
PMV BLD AUTO: 11.3 FL (ref 7–12)
POIKILOCYTES: ABNORMAL
POTASSIUM SERPL-SCNC: 4.5 MMOL/L (ref 3.5–5)
PROT SERPL-MCNC: 7.4 G/DL (ref 6.4–8.3)
RBC # BLD AUTO: 4.56 E12/L (ref 3.5–5.5)
SODIUM SERPL-SCNC: 140 MMOL/L (ref 132–146)
WBC # BLD: 3.7 E9/L (ref 4.5–11.5)

## 2023-07-05 ENCOUNTER — OFFICE VISIT (OUTPATIENT)
Dept: PODIATRY | Age: 28
End: 2023-07-05
Payer: COMMERCIAL

## 2023-07-05 DIAGNOSIS — S99.922S INJURY OF TOENAIL, LEFT, SEQUELA: ICD-10-CM

## 2023-07-05 DIAGNOSIS — M79.672 PAIN IN LEFT FOOT: ICD-10-CM

## 2023-07-05 DIAGNOSIS — L60.0 INGROWN NAIL: Primary | ICD-10-CM

## 2023-07-05 DIAGNOSIS — L03.032 CELLULITIS OF LEFT TOE: ICD-10-CM

## 2023-07-05 PROCEDURE — G8420 CALC BMI NORM PARAMETERS: HCPCS | Performed by: PODIATRIST

## 2023-07-05 PROCEDURE — G8427 DOCREV CUR MEDS BY ELIG CLIN: HCPCS | Performed by: PODIATRIST

## 2023-07-05 PROCEDURE — 1036F TOBACCO NON-USER: CPT | Performed by: PODIATRIST

## 2023-07-05 PROCEDURE — 11730 AVULSION NAIL PLATE SIMPLE 1: CPT | Performed by: PODIATRIST

## 2023-07-05 PROCEDURE — 99213 OFFICE O/P EST LOW 20 MIN: CPT | Performed by: PODIATRIST

## 2023-07-05 RX ORDER — CEPHALEXIN 500 MG/1
500 CAPSULE ORAL 2 TIMES DAILY
Qty: 14 CAPSULE | Refills: 0 | Status: SHIPPED | OUTPATIENT
Start: 2023-07-05 | End: 2023-07-12

## 2023-07-05 NOTE — PROGRESS NOTES
Patient in office with c/o injury to second toenail left foot. Caught toenail while crawling into bed.       23  Deidre D Magill : 1995 Sex: female  Age: 25 y.o. Patient was referred by Christine Gross MD    CC:    Injury left second toenail    HPI:   This familiar 78-year-old female patient to my practice seen today injury left second toenail. Injury sustained this past weekend where she had her second toenail on the bottom of her bed. Does have tenderness and redness around the left second toenail. Does have regular flip-flops on today she does have tenderness applying any pressure on top of left second toenail. No additional pedal complaints at this time. ROS:  Const: Denies constitutional symptoms  Musculo: Denies symptoms other than stated above  Skin: Denies symptoms other than stated above       Current Outpatient Medications:     cephALEXin (KEFLEX) 500 MG capsule, Take 1 capsule by mouth 2 times daily for 7 days Take by mouth twice daily. , Disp: 14 capsule, Rfl: 0    itraconazole (SPORANOX) 100 MG capsule, Take 2 capsules on day one and then take 1 capsule daily for 30 days total, Disp: 31 capsule, Rfl: 0    ferrous sulfate (IRON 325) 325 (65 Fe) MG tablet, Take 1 tablet by mouth daily (with breakfast), Disp: 30 tablet, Rfl: 3    adapalene (DIFFERIN) 0.1 % cream, Apply topically nightly Apply topically nightly. 22 Pt reports using every other day., Disp: , Rfl:     clobetasol (TEMOVATE) 0.05 % cream, Apply topically 1-2 times daily for 7-10 days at most, then 2 weeks off. Do not apply to face., Disp: 45 g, Rfl: 1  Allergies   Allergen Reactions    Iv Dye [Iodides] Other (See Comments)     Shut down kidneys, required hospitalization.     Medrol [Methylprednisolone]      IV caused tingling to arms legs face    Percocet [Oxycodone-Acetaminophen] Nausea Only and Other (See Comments)     Jaundice, fever, chills and hallucinations       Past Medical History:   Diagnosis Date

## 2023-07-09 ENCOUNTER — PATIENT MESSAGE (OUTPATIENT)
Dept: FAMILY MEDICINE CLINIC | Age: 28
End: 2023-07-09

## 2023-07-09 DIAGNOSIS — L40.9 PSORIASIS: Primary | ICD-10-CM

## 2023-07-10 NOTE — TELEPHONE ENCOUNTER
From: Angelique Ballesteros  To: Dr. Sara Brock  Sent: 2023 3:33 PM EDT  Subject: Dermatologist referral    Hi Dr Bryson Dubin, I hope you're doing well. I just wanted to touch base with you regarding my psoriasis. It's becoming pretty severe on my scalp and my nails, and I'm hoping to find something to treat it. I just lost a toenail, and a couple of my fingernails are pretty bad right now. I went to [facility] before, but the woman I saw was so rude it put me off the whole dermatology experience completely. Sorry for the lengthy message, just wanted to inform you I've seen someone at the [facility] practice, and maybe theres someone/somewhere else you recommend. Also, while I am sending a message, I will include that we have to re-file FMLA paperwork, as we did not realize it was due to  within a day or so of when we dropped it off to be filled out. I do apologize for this, I know it is a lengthy process, the date unfortunately got overlooked. We will re print the paperwork and get it to your office asap. I appreciate your time, thank you so much.

## 2023-07-13 ENCOUNTER — TELEPHONE (OUTPATIENT)
Dept: FAMILY MEDICINE CLINIC | Age: 28
End: 2023-07-13

## 2023-07-13 DIAGNOSIS — L40.9 PSORIASIS: Primary | ICD-10-CM

## 2023-07-17 NOTE — TELEPHONE ENCOUNTER
Called Dr. Koby Ram office - [phone]  No answer, no VM available.   Will need to call back to see if they can take pt's primary insurance on [facility]

## 2023-07-18 NOTE — TELEPHONE ENCOUNTER
Per Dr. Mis Bass office they don't accept any patients with [employer] whether it is their primary or secondary insurance.     Looks like pt is indeed needing a new derm referral.

## 2023-07-18 NOTE — TELEPHONE ENCOUNTER
Per spouse, this renewal was not turned in on time, and requires a whole new set of paperwork to reapply for the FMLA. Spouse says things are going well for now, and they will let us know if they need this filled out again in the future.

## 2023-07-20 ENCOUNTER — OFFICE VISIT (OUTPATIENT)
Dept: PODIATRY | Age: 28
End: 2023-07-20
Payer: COMMERCIAL

## 2023-07-20 VITALS — BODY MASS INDEX: 22.5 KG/M2 | WEIGHT: 127 LBS | HEIGHT: 63 IN

## 2023-07-20 DIAGNOSIS — M79.672 PAIN IN LEFT FOOT: ICD-10-CM

## 2023-07-20 DIAGNOSIS — L60.0 INGROWN NAIL: Primary | ICD-10-CM

## 2023-07-20 DIAGNOSIS — S99.922S INJURY OF TOENAIL, LEFT, SEQUELA: ICD-10-CM

## 2023-07-20 PROCEDURE — G8427 DOCREV CUR MEDS BY ELIG CLIN: HCPCS | Performed by: PODIATRIST

## 2023-07-20 PROCEDURE — G8420 CALC BMI NORM PARAMETERS: HCPCS | Performed by: PODIATRIST

## 2023-07-20 PROCEDURE — 99213 OFFICE O/P EST LOW 20 MIN: CPT | Performed by: PODIATRIST

## 2023-07-20 PROCEDURE — 1036F TOBACCO NON-USER: CPT | Performed by: PODIATRIST

## 2023-07-20 NOTE — PROGRESS NOTES
Patient in office to follow up post nail avulsion second toe left foot. No complaints at this time. CC:    Follow-up total nail avulsion left second toenail  Follow-up left second toenail injury    HPI:   Presents today follow-up second toenail injury. Pain-free today. No open wounds. No redness. Denies any nausea vomiting fever chills shortness of breath. Wearing regular sandals today. No additional pedal complaints. ROS:  Const: Denies constitutional symptoms  Musculo: Denies symptoms other than stated above  Skin: Denies symptoms other than stated above       Current Outpatient Medications:     itraconazole (SPORANOX) 100 MG capsule, Take 1 capsule by mouth daily for 14 days Take 2 capsules on day one and then take 1 capsule daily for 30 days total, Disp: 14 capsule, Rfl: 0    ferrous sulfate (IRON 325) 325 (65 Fe) MG tablet, Take 1 tablet by mouth daily (with breakfast), Disp: 30 tablet, Rfl: 3    adapalene (DIFFERIN) 0.1 % cream, Apply topically nightly Apply topically nightly. 12/16/22 Pt reports using every other day., Disp: , Rfl:     clobetasol (TEMOVATE) 0.05 % cream, Apply topically 1-2 times daily for 7-10 days at most, then 2 weeks off. Do not apply to face., Disp: 45 g, Rfl: 1  Allergies   Allergen Reactions    Iv Dye [Iodides] Other (See Comments)     Shut down kidneys, required hospitalization.     Medrol [Methylprednisolone]      IV caused tingling to arms legs face    Percocet [Oxycodone-Acetaminophen] Nausea Only and Other (See Comments)     Jaundice, fever, chills and hallucinations       Past Medical History:   Diagnosis Date    Anemia     Chronic kidney disease     Depression     no medications    GERD (gastroesophageal reflux disease)     Heart abnormality     Hiatal hernia     PONV (postoperative nausea and vomiting)     Thyroid disease     Trauma     Ulcer            Vitals:    07/20/23 1358   Weight: 127 lb (57.6 kg)   Height: 5' 3\" (1.6 m)       Work History/Social History:

## 2023-08-01 ENCOUNTER — PATIENT MESSAGE (OUTPATIENT)
Dept: FAMILY MEDICINE CLINIC | Age: 28
End: 2023-08-01

## 2023-08-01 DIAGNOSIS — B35.1 ONYCHOMYCOSIS: ICD-10-CM

## 2023-08-02 RX ORDER — TERBINAFINE HYDROCHLORIDE 250 MG/1
250 TABLET ORAL DAILY
Qty: 90 TABLET | Refills: 0 | Status: SHIPPED | OUTPATIENT
Start: 2023-08-02 | End: 2023-10-31

## 2023-08-02 NOTE — TELEPHONE ENCOUNTER
From: Whitley Aguilar  To: Dr. Mortimer Pique  Sent: 8/1/2023 5:59 PM EDT  Subject: Susan Rahman, I hope you're doing well. I wanted to touch base on my lab work for my liver, and ask if you would please send a prescription for Lamisil tablets to the pharmacy for me. I appreciate your time, have a great week!

## 2023-08-11 ENCOUNTER — PATIENT MESSAGE (OUTPATIENT)
Dept: FAMILY MEDICINE CLINIC | Age: 28
End: 2023-08-11

## 2023-08-11 RX ORDER — CLINDAMYCIN PHOSPHATE 10 MG/ML
SOLUTION TOPICAL
Qty: 60 EACH | Refills: 3 | Status: SHIPPED | OUTPATIENT
Start: 2023-08-11

## 2023-08-11 NOTE — TELEPHONE ENCOUNTER
From: Nilay Kapoor  To: Dr. Clair Hardy: 8/11/2023 10:11 AM EDT  Subject: Zane Tejeda, I'm sorry for the bother. I just wanted to ask if you could please send an Rx for Clindamyacin acne solution or pledgets please. I've been using the adapalene but I'm still getting break outs, so I was going to go back to Clindamyacin if that's ok. Thank you so much for your time, I really appreciate it!

## 2023-09-20 RX ORDER — ONDANSETRON 4 MG/1
TABLET, FILM COATED ORAL
Qty: 30 TABLET | Refills: 0 | Status: SHIPPED | OUTPATIENT
Start: 2023-09-20

## 2023-10-05 RX ORDER — CLINDAMYCIN PHOSPHATE 11.9 MG/ML
SOLUTION TOPICAL
Qty: 30 ML | Refills: 3 | Status: SHIPPED | OUTPATIENT
Start: 2023-10-05

## 2023-11-10 ENCOUNTER — HOSPITAL ENCOUNTER (OUTPATIENT)
Age: 28
Discharge: HOME | End: 2023-11-10
Payer: COMMERCIAL

## 2023-11-10 LAB
ALBUMIN SERPL-MCNC: 4.9 G/DL (ref 3.5–5.2)
ALP SERPL-CCNC: 44 U/L (ref 35–104)
ALT SERPL-CCNC: 10 U/L (ref 0–32)
ANION GAP SERPL CALCULATED.3IONS-SCNC: 11 MMOL/L (ref 7–16)
AST SERPL-CCNC: 17 U/L (ref 0–31)
BASOPHILS # BLD: 0.03 K/UL (ref 0–0.2)
BASOPHILS NFR BLD: 0 % (ref 0–2)
BILIRUB SERPL-MCNC: 2 MG/DL (ref 0–1.2)
BUN SERPL-MCNC: 10 MG/DL (ref 6–20)
CALCIUM SERPL-MCNC: 9.7 MG/DL (ref 8.6–10.2)
CHLORIDE SERPL-SCNC: 100 MMOL/L (ref 98–107)
CO2 SERPL-SCNC: 26 MMOL/L (ref 22–29)
CREAT SERPL-MCNC: 0.6 MG/DL (ref 0.5–1)
EOSINOPHIL # BLD: 0.13 K/UL (ref 0.05–0.5)
EOSINOPHILS RELATIVE PERCENT: 2 % (ref 0–6)
ERYTHROCYTE [DISTWIDTH] IN BLOOD BY AUTOMATED COUNT: 13.1 % (ref 11.5–15)
GFR SERPL CREATININE-BSD FRML MDRD: >60 ML/MIN/1.73M2
GLUCOSE SERPL-MCNC: 72 MG/DL (ref 74–99)
HCT VFR BLD AUTO: 41.3 % (ref 34–48)
HGB BLD-MCNC: 13.7 G/DL (ref 11.5–15.5)
IMM GRANULOCYTES # BLD AUTO: <0.03 K/UL (ref 0–0.58)
IMM GRANULOCYTES NFR BLD: 0 % (ref 0–5)
LYMPHOCYTES NFR BLD: 1.65 K/UL (ref 1.5–4)
LYMPHOCYTES RELATIVE PERCENT: 23 % (ref 20–42)
MCH RBC QN AUTO: 31.3 PG (ref 26–35)
MCHC RBC AUTO-ENTMCNC: 33.2 G/DL (ref 32–34.5)
MCV RBC AUTO: 94.3 FL (ref 80–99.9)
MONOCYTES NFR BLD: 0.45 K/UL (ref 0.1–0.95)
MONOCYTES NFR BLD: 6 % (ref 2–12)
NEUTROPHILS NFR BLD: 69 % (ref 43–80)
NEUTS SEG NFR BLD: 4.93 K/UL (ref 1.8–7.3)
PLATELET # BLD AUTO: 268 K/UL (ref 130–450)
PMV BLD AUTO: 10.7 FL (ref 7–12)
POTASSIUM SERPL-SCNC: 3.7 MMOL/L (ref 3.5–5)
PROT SERPL-MCNC: 7.9 G/DL (ref 6.4–8.3)
RBC # BLD AUTO: 4.38 M/UL (ref 3.5–5.5)
SODIUM SERPL-SCNC: 137 MMOL/L (ref 132–146)
WBC OTHER # BLD: 7.2 K/UL (ref 4.5–11.5)

## 2023-11-10 PROCEDURE — 36415 COLL VENOUS BLD VENIPUNCTURE: CPT

## 2023-11-10 PROCEDURE — 85025 COMPLETE CBC W/AUTO DIFF WBC: CPT

## 2023-11-10 PROCEDURE — 80053 COMPREHEN METABOLIC PANEL: CPT

## 2023-12-06 ENCOUNTER — TELEPHONE (OUTPATIENT)
Dept: FAMILY MEDICINE CLINIC | Age: 28
End: 2023-12-06

## 2024-02-01 ENCOUNTER — PATIENT MESSAGE (OUTPATIENT)
Dept: FAMILY MEDICINE CLINIC | Age: 29
End: 2024-02-01

## 2024-02-02 RX ORDER — CLINDAMYCIN PHOSPHATE 11.9 MG/ML
SOLUTION TOPICAL
Qty: 30 ML | Refills: 3 | Status: CANCELLED | OUTPATIENT
Start: 2024-02-02

## 2024-02-02 RX ORDER — ESCITALOPRAM OXALATE 10 MG/1
10 TABLET ORAL DAILY
Qty: 30 TABLET | Refills: 2 | Status: SHIPPED | OUTPATIENT
Start: 2024-02-02

## 2024-02-02 RX ORDER — ONDANSETRON 4 MG/1
TABLET, FILM COATED ORAL
Qty: 30 TABLET | Refills: 1 | Status: SHIPPED | OUTPATIENT
Start: 2024-02-02

## 2024-02-02 RX ORDER — CLINDAMYCIN PHOSPHATE 10 MG/ML
SOLUTION TOPICAL
Qty: 60 EACH | Refills: 3 | Status: SHIPPED | OUTPATIENT
Start: 2024-02-02

## 2024-02-02 NOTE — TELEPHONE ENCOUNTER
From: Deidre D Magill  To: Dr. Kd Cuellar  Sent: 2/1/2024 1:21 PM EST  Subject: Lexapro    Tevin STEPHENS, I hope you're doing well. I spoke with nurse Cate on the phone a couple of months ago about severe depression and anxiety symptoms. I attended 8 weeks of group therapy, as well as seeing an individual therapist weekly. I am struggling a great deal. At this time I am not really able to leave my house because my anxiety is so extreme, I have had all of my appointments via Telehealth. My therapist recommended a prescription for Lexapro if you would be willing to send that to the pharmacy for me. She also diagnosed me with OCD last week, and said this medication helps with the ruminations. Thank you for your time.     Theresa

## 2024-03-07 ENCOUNTER — PATIENT MESSAGE (OUTPATIENT)
Dept: FAMILY MEDICINE CLINIC | Age: 29
End: 2024-03-07

## 2024-03-07 DIAGNOSIS — D50.9 IRON DEFICIENCY ANEMIA, UNSPECIFIED IRON DEFICIENCY ANEMIA TYPE: Primary | ICD-10-CM

## 2024-03-07 DIAGNOSIS — D50.9 MICROCYTIC ANEMIA: ICD-10-CM

## 2024-03-07 RX ORDER — AMOXICILLIN 500 MG/1
500 CAPSULE ORAL 2 TIMES DAILY
Qty: 20 CAPSULE | Refills: 0 | Status: SHIPPED | OUTPATIENT
Start: 2024-03-07 | End: 2024-03-17

## 2024-03-07 RX ORDER — FERROUS SULFATE 325(65) MG
325 TABLET ORAL
Qty: 30 TABLET | Refills: 3 | Status: SHIPPED | OUTPATIENT
Start: 2024-03-07

## 2024-03-07 NOTE — TELEPHONE ENCOUNTER
From: Deidre D Magill  To: Dr. Kd Cuellar  Sent: 3/7/2024 9:18 AM EST  Subject: Iron refill    Tevin STEPHENS I hope you're doing well. Last time I filled my iron was my last refill. Also, Milad was treated as a walk in for strep today. His swab wasn't a great sample so they sent Amoxicillin for him since his throat is very red and his glands are swollen. I wondered if you'd be able to send Amoxicillin to the pharmacy for me as well, since my throat really hurts and is very red also. Thank you for your time I really appreciate it.     Theresa

## 2024-04-12 ENCOUNTER — HOSPITAL ENCOUNTER (OUTPATIENT)
Age: 29
Discharge: HOME | End: 2024-04-12
Payer: COMMERCIAL

## 2024-04-12 DIAGNOSIS — N92.0 MENORRHAGIA WITH REGULAR CYCLE: ICD-10-CM

## 2024-04-12 DIAGNOSIS — D50.9 IRON DEFICIENCY ANEMIA, UNSPECIFIED IRON DEFICIENCY ANEMIA TYPE: ICD-10-CM

## 2024-04-12 LAB
BASOPHILS # BLD: 0.02 K/UL (ref 0–0.2)
BASOPHILS NFR BLD: 1 % (ref 0–2)
EOSINOPHIL # BLD: 0.11 K/UL (ref 0.05–0.5)
EOSINOPHILS RELATIVE PERCENT: 3 % (ref 0–6)
ERYTHROCYTE [DISTWIDTH] IN BLOOD BY AUTOMATED COUNT: 12.6 % (ref 11.5–15)
FERRITIN SERPL-MCNC: 17 NG/ML
HCT VFR BLD AUTO: 43.4 % (ref 34–48)
HGB BLD-MCNC: 14.4 G/DL (ref 11.5–15.5)
IMM GRANULOCYTES # BLD AUTO: <0.03 K/UL (ref 0–0.58)
IMM GRANULOCYTES NFR BLD: 0 % (ref 0–5)
IRON SATN MFR SERPL: 17 % (ref 15–50)
IRON SERPL-MCNC: 79 UG/DL (ref 37–145)
LYMPHOCYTES NFR BLD: 0.99 K/UL (ref 1.5–4)
LYMPHOCYTES RELATIVE PERCENT: 31 % (ref 20–42)
MCH RBC QN AUTO: 31.9 PG (ref 26–35)
MCHC RBC AUTO-ENTMCNC: 33.2 G/DL (ref 32–34.5)
MCV RBC AUTO: 96.2 FL (ref 80–99.9)
MONOCYTES NFR BLD: 0.17 K/UL (ref 0.1–0.95)
MONOCYTES NFR BLD: 5 % (ref 2–12)
NEUTROPHILS NFR BLD: 60 % (ref 43–80)
NEUTS SEG NFR BLD: 1.93 K/UL (ref 1.8–7.3)
PLATELET # BLD AUTO: 218 K/UL (ref 130–450)
PMV BLD AUTO: 10.3 FL (ref 7–12)
RBC # BLD AUTO: 4.51 M/UL (ref 3.5–5.5)
T3FREE SERPL-MCNC: 2.98 PG/ML (ref 2–4.4)
T4 FREE SERPL-MCNC: 1.3 NG/DL (ref 0.9–1.7)
TIBC SERPL-MCNC: 470 UG/DL (ref 250–450)
TSH SERPL DL<=0.05 MIU/L-ACNC: 0.65 UIU/ML (ref 0.27–4.2)
WBC OTHER # BLD: 3.2 K/UL (ref 4.5–11.5)

## 2024-04-12 PROCEDURE — 84439 ASSAY OF FREE THYROXINE: CPT

## 2024-04-12 PROCEDURE — 84481 FREE ASSAY (FT-3): CPT

## 2024-04-12 PROCEDURE — 83550 IRON BINDING TEST: CPT

## 2024-04-12 PROCEDURE — 83540 ASSAY OF IRON: CPT

## 2024-04-12 PROCEDURE — 84443 ASSAY THYROID STIM HORMONE: CPT

## 2024-04-12 PROCEDURE — 82728 ASSAY OF FERRITIN: CPT

## 2024-04-12 PROCEDURE — 85025 COMPLETE CBC W/AUTO DIFF WBC: CPT

## 2024-04-12 PROCEDURE — 36415 COLL VENOUS BLD VENIPUNCTURE: CPT

## 2024-04-12 PROCEDURE — 86481 TB AG RESPONSE T-CELL SUSP: CPT

## 2024-04-16 LAB — T SPOT TB TEST: NORMAL

## 2024-05-01 NOTE — DISCHARGE INSTR - ACTIVITY
After Your Delivery Discharge Instructions    What to do after you leave the hospital:  Recommended diet: regular diet    Recommended activity: No driving for 2 weeks. No sex or tampon use for 6 weeks. No douching. No heavy lifting (greater than baby and carrier) for 6 weeks. Initially, avoid frequent ambulation with stairs - start slowly then increase as tolerated. You may return to work in 10 -8 weeks. Showers are fine - avoid bath tubs, hot tubs, swimming. Follow-up in 1 week for incision check and in 6 weeks for final postpartum visit.     Call the Physician with any of these  signs and symptoms:    Warning signs regarding incision:  · \"Popping\" of stitches or staples  · Foul smelling discharge or pus  · More redness or streaks around surgical incision than before    Incision care:  · Keep incision uncovered  · No tub baths until OK'd by your Physician      After your delivery - signs and symptoms to watch for:  · Fever - Oral temperature greater than 100.4 degrees Fahrenheit  · Foul-smelling vaginal discharge  · Headache unrelieved by \"pain meds\"  · Difficulty urinating  · Breasts reddened, hard, hot to the touch  · Nipple discharge which is foul-smelling or contains pus  · Increased pain at the site of the surgical incision  · Difficulty breathing with or without chest pain  · New calf pain especially if only on one side  · Sudden, continuing increased vaginal bleeding (heavier than a period) with or without clots  · Unrelieved feelings of:  · Inability to cope  · Sadness  · Anxiety  · Lack of interest in baby  · Insomnia  · Crying     What to do at home:  · Resume activity gradually   · Don't lift anything heavier than baby and carrier until OK'd by your Physician   · No sex until OK'd by your Physician  · Eat regular nutritious meals  · Take pain medication as prescribed whenever you need them  · To avoid/relieve constipation take stool softeners if advised   · Increase fiber in your Left voicemail for patient.    Caleb Berrios, PhT           diet    Most importantly ENJOY your Baby!  - Dr. Gerard Siddiqui =)))    Please call immediately with Questions and Concerns - [phone] (Office) or [phone] (Answering Service) after hours and weekends. By signing below, I understand that if any emergent problems occur, once I leave the hospital, I am to dial #[phone] or go immediately to the nearest Emergency Room. For less emergent matters,  Dr. Irma Blount can be reached by calling his Office or  answering service at the above numbers. I understand and acknowledge receipt of the instructions indicated above.

## 2024-08-20 ENCOUNTER — PATIENT MESSAGE (OUTPATIENT)
Dept: FAMILY MEDICINE CLINIC | Age: 29
End: 2024-08-20

## 2024-08-26 PROBLEM — F33.0 MAJOR DEPRESSIVE DISORDER, RECURRENT, MILD (HCC): Status: ACTIVE | Noted: 2024-08-26

## 2024-08-26 PROBLEM — F33.9 MAJOR DEPRESSIVE DISORDER, RECURRENT, UNSPECIFIED (HCC): Status: ACTIVE | Noted: 2024-08-26

## 2024-08-26 PROBLEM — F33.1 MAJOR DEPRESSIVE DISORDER, RECURRENT, MODERATE (HCC): Status: ACTIVE | Noted: 2024-08-26

## 2024-08-27 ENCOUNTER — OFFICE VISIT (OUTPATIENT)
Dept: FAMILY MEDICINE CLINIC | Age: 29
End: 2024-08-27
Payer: COMMERCIAL

## 2024-08-27 VITALS
DIASTOLIC BLOOD PRESSURE: 70 MMHG | TEMPERATURE: 99.3 F | OXYGEN SATURATION: 99 % | BODY MASS INDEX: 21.79 KG/M2 | SYSTOLIC BLOOD PRESSURE: 106 MMHG | HEART RATE: 76 BPM | HEIGHT: 63 IN | WEIGHT: 123 LBS

## 2024-08-27 DIAGNOSIS — R19.7 DIARRHEA, UNSPECIFIED TYPE: Primary | ICD-10-CM

## 2024-08-27 DIAGNOSIS — R63.0 LOSS OF APPETITE: ICD-10-CM

## 2024-08-27 DIAGNOSIS — F33.9 RECURRENT MAJOR DEPRESSIVE DISORDER, REMISSION STATUS UNSPECIFIED (HCC): ICD-10-CM

## 2024-08-27 DIAGNOSIS — K92.1 BLOODY STOOL: ICD-10-CM

## 2024-08-27 DIAGNOSIS — R10.13 EPIGASTRIC ABDOMINAL PAIN: ICD-10-CM

## 2024-08-27 PROBLEM — F33.1 MAJOR DEPRESSIVE DISORDER, RECURRENT, MODERATE (HCC): Status: RESOLVED | Noted: 2024-08-26 | Resolved: 2024-08-27

## 2024-08-27 PROCEDURE — 99214 OFFICE O/P EST MOD 30 MIN: CPT | Performed by: FAMILY MEDICINE

## 2024-08-27 RX ORDER — ESOMEPRAZOLE MAGNESIUM 40 MG/1
40 CAPSULE, DELAYED RELEASE ORAL
Qty: 90 CAPSULE | Refills: 0 | Status: SHIPPED | OUTPATIENT
Start: 2024-08-27

## 2024-08-27 RX ORDER — ONDANSETRON 4 MG/1
TABLET, FILM COATED ORAL
Qty: 30 TABLET | Refills: 1 | Status: SHIPPED | OUTPATIENT
Start: 2024-08-27

## 2024-08-27 SDOH — ECONOMIC STABILITY: FOOD INSECURITY: WITHIN THE PAST 12 MONTHS, THE FOOD YOU BOUGHT JUST DIDN'T LAST AND YOU DIDN'T HAVE MONEY TO GET MORE.: NEVER TRUE

## 2024-08-27 SDOH — ECONOMIC STABILITY: INCOME INSECURITY: HOW HARD IS IT FOR YOU TO PAY FOR THE VERY BASICS LIKE FOOD, HOUSING, MEDICAL CARE, AND HEATING?: NOT HARD AT ALL

## 2024-08-27 SDOH — ECONOMIC STABILITY: FOOD INSECURITY: WITHIN THE PAST 12 MONTHS, YOU WORRIED THAT YOUR FOOD WOULD RUN OUT BEFORE YOU GOT MONEY TO BUY MORE.: NEVER TRUE

## 2024-08-27 SDOH — ECONOMIC STABILITY: TRANSPORTATION INSECURITY
IN THE PAST 12 MONTHS, HAS LACK OF TRANSPORTATION KEPT YOU FROM MEETINGS, WORK, OR FROM GETTING THINGS NEEDED FOR DAILY LIVING?: NO

## 2024-08-27 ASSESSMENT — PATIENT HEALTH QUESTIONNAIRE - PHQ9
SUM OF ALL RESPONSES TO PHQ9 QUESTIONS 1 & 2: 0
SUM OF ALL RESPONSES TO PHQ QUESTIONS 1-9: 5
5. POOR APPETITE OR OVEREATING: SEVERAL DAYS
SUM OF ALL RESPONSES TO PHQ QUESTIONS 1-9: 5
1. LITTLE INTEREST OR PLEASURE IN DOING THINGS: NOT AT ALL
10. IF YOU CHECKED OFF ANY PROBLEMS, HOW DIFFICULT HAVE THESE PROBLEMS MADE IT FOR YOU TO DO YOUR WORK, TAKE CARE OF THINGS AT HOME, OR GET ALONG WITH OTHER PEOPLE: SOMEWHAT DIFFICULT
10. IF YOU CHECKED OFF ANY PROBLEMS, HOW DIFFICULT HAVE THESE PROBLEMS MADE IT FOR YOU TO DO YOUR WORK, TAKE CARE OF THINGS AT HOME, OR GET ALONG WITH OTHER PEOPLE: SOMEWHAT DIFFICULT
6. FEELING BAD ABOUT YOURSELF - OR THAT YOU ARE A FAILURE OR HAVE LET YOURSELF OR YOUR FAMILY DOWN: NOT AT ALL
4. FEELING TIRED OR HAVING LITTLE ENERGY: SEVERAL DAYS
SUM OF ALL RESPONSES TO PHQ QUESTIONS 1-9: 5
3. TROUBLE FALLING OR STAYING ASLEEP: SEVERAL DAYS
8. MOVING OR SPEAKING SO SLOWLY THAT OTHER PEOPLE COULD HAVE NOTICED. OR THE OPPOSITE - BEING SO FIDGETY OR RESTLESS THAT YOU HAVE BEEN MOVING AROUND A LOT MORE THAN USUAL: SEVERAL DAYS
2. FEELING DOWN, DEPRESSED OR HOPELESS: NOT AT ALL
3. TROUBLE FALLING OR STAYING ASLEEP: SEVERAL DAYS
9. THOUGHTS THAT YOU WOULD BE BETTER OFF DEAD, OR OF HURTING YOURSELF: NOT AT ALL
4. FEELING TIRED OR HAVING LITTLE ENERGY: SEVERAL DAYS
7. TROUBLE CONCENTRATING ON THINGS, SUCH AS READING THE NEWSPAPER OR WATCHING TELEVISION: SEVERAL DAYS
8. MOVING OR SPEAKING SO SLOWLY THAT OTHER PEOPLE COULD HAVE NOTICED. OR THE OPPOSITE, BEING SO FIGETY OR RESTLESS THAT YOU HAVE BEEN MOVING AROUND A LOT MORE THAN USUAL: SEVERAL DAYS
SUM OF ALL RESPONSES TO PHQ QUESTIONS 1-9: 5
6. FEELING BAD ABOUT YOURSELF - OR THAT YOU ARE A FAILURE OR HAVE LET YOURSELF OR YOUR FAMILY DOWN: NOT AT ALL
2. FEELING DOWN, DEPRESSED OR HOPELESS: NOT AT ALL
SUM OF ALL RESPONSES TO PHQ QUESTIONS 1-9: 5
5. POOR APPETITE OR OVEREATING: SEVERAL DAYS
1. LITTLE INTEREST OR PLEASURE IN DOING THINGS: NOT AT ALL
7. TROUBLE CONCENTRATING ON THINGS, SUCH AS READING THE NEWSPAPER OR WATCHING TELEVISION: SEVERAL DAYS
9. THOUGHTS THAT YOU WOULD BE BETTER OFF DEAD, OR OF HURTING YOURSELF: NOT AT ALL

## 2024-08-27 NOTE — PROGRESS NOTES
[facility]  Office Progress Note - Dr. Cuellar  8/27/24    CC:   Chief Complaint   Patient presents with    Nausea     Nausea, Diarrhea, decreased appetite. One episode of blood in stool.    GYN has her on Iron, but iron is irritating stomach.  Does have Cyst on Uterus per GYN.        /70   Pulse 76   Temp 99.3 °F (37.4 °C) (Temporal)   Ht 1.6 m (5' 3\")   Wt 55.8 kg (123 lb)   LMP 08/13/2024   SpO2 99%   BMI 21.79 kg/m²   Wt Readings from Last 3 Encounters:   08/27/24 55.8 kg (123 lb)   08/26/24 56.6 kg (124 lb 12.8 oz)   03/06/24 55.5 kg (122 lb 6.4 oz)       HPI: Presents to discuss GI concerns recently.  She was on vacation when she sent a [employer] message about some GI symptoms.    \"Hi Dr STEPHENS,     I just wanted to reach out because I am having some extreme stomach symptoms, and have been for some time but it's the worst it's ever been lately. Lots of nausea, loss of appetite and inability to eat well, diarrhea, and today I began having blood and extremely watery stool. I am in a lot of pain and discomfort. We are about to cut our vacation short to come home once I'm able to make the trip. I wasn't sure if I should see a specialist or possibly try an IBS medication. I will also do the stool sample once I get home.     Thank you for your time  Deidre Magill\"      Started on nuvaring in Jan for heavy periods.   Irreg cucle and persistent bleeing for about 6 months - suspended nuvaring in July.  Maybe stomach symptoms a bit better after suspension.   \"I was living on toast.\" Nothing settling well in stomach.  Epigastric discomfort.  Nausea, no appeite, No chronic diarrhea  - just that episode on vacation.  No further blood in stool.  She notes that there was reports of elevated bacterial levels in the ocean, though she is not exactly sure if that involved the area where she was swimming.  Epigastric pain and sometimes through to back.  She notes that historically she did have  ulcers that she had to take medications for in the past and that did help improve her symptoms.  Iron chronically, then stomach symptoms worsened, so she backed off.  But when has felt more exhausted, she'll start it back up sometimes.   She feels there is really no pattern to it     Saw OB yday abut cycle and cyst on left side of uterus to be removed. Has had this before. No labs.   Megan Gabriel with Dr. BROWN's office for Gyn.     _________________________________________________________    Assessment / Plan  Theresa was seen today for nausea.    Diagnoses and all orders for this visit:    Diarrhea, unspecified type    Bloody stool    This seems to have been a self-limited episode and may have been infectious in etiology from swimming in the ocean where there may have been high bacterial counts.  She has not had any recurrence.  I did give her a fit test today.      Loss of appetite  -     CBC with Auto Differential; Future  -     Comprehensive Metabolic Panel; Future  -     Iron and TIBC; Future  -     Ferritin; Future  -     esomeprazole (NEXIUM) 40 MG delayed release capsule; Take 1 capsule by mouth every morning (before breakfast)    Epigastric abdominal pain  -     CBC with Auto Differential; Future  -     Comprehensive Metabolic Panel; Future  -     Iron and TIBC; Future  -     Ferritin; Future  -     esomeprazole (NEXIUM) 40 MG delayed release capsule; Take 1 capsule by mouth every morning (before breakfast)    Her chronic GI complaints sound much more like gastritis or peptic ulcers or dyspepsia.  She has had gastric ulcers in the past so this is certainly on the differential.  Has suction because she really is not having any bowel symptoms associated, we will treat with Nexium for a month and see if her symptoms improve.  Also check some labs as above    Recurrent major depressive disorder, remission status unspecified (HCC)  Well-controlled right now.  She is following with her counselor every other week.  She

## 2024-09-04 DIAGNOSIS — R63.0 LOSS OF APPETITE: ICD-10-CM

## 2024-09-04 DIAGNOSIS — R10.13 EPIGASTRIC ABDOMINAL PAIN: ICD-10-CM

## 2024-09-04 LAB
ALBUMIN: 4.4 G/DL
ALP BLD-CCNC: 50 U/L
ALT SERPL-CCNC: 18 U/L
ANION GAP SERPL CALCULATED.3IONS-SCNC: ABNORMAL MMOL/L
AST SERPL-CCNC: 20 U/L
BASOPHILS ABSOLUTE: 0.03 /ΜL
BASOPHILS RELATIVE PERCENT: 0.5 %
BILIRUB SERPL-MCNC: 1.6 MG/DL (ref 0.1–1.4)
BUN BLDV-MCNC: 12 MG/DL
CALCIUM SERPL-MCNC: 9.3 MG/DL
CHLORIDE BLD-SCNC: 103 MMOL/L
CO2: 23.2 MMOL/L
CREAT SERPL-MCNC: 0.62 MG/DL
EOSINOPHILS ABSOLUTE: 0.12 /ΜL
EOSINOPHILS RELATIVE PERCENT: 2.1 %
FERRITIN: 22 NG/ML (ref 9–150)
GFR, ESTIMATED: >90
GLUCOSE BLD-MCNC: 85 MG/DL
HCT VFR BLD CALC: 38.8 % (ref 36–46)
HEMOGLOBIN: 13.3 G/DL (ref 12–16)
IRON: 87
LYMPHOCYTES ABSOLUTE: 1.49 /ΜL
LYMPHOCYTES RELATIVE PERCENT: 26.6 %
MCH RBC QN AUTO: 31.6 PG
MCHC RBC AUTO-ENTMCNC: 34.3 G/DL
MCV RBC AUTO: 92.2 FL
MONOCYTES ABSOLUTE: 0.44 /ΜL
MONOCYTES RELATIVE PERCENT: 7.8 %
NEUTROPHILS ABSOLUTE: 3.52 /ΜL
NEUTROPHILS RELATIVE PERCENT: 62.8 %
PDW BLD-RTO: 12.2 %
PLATELET # BLD: 239 K/ΜL
PMV BLD AUTO: 10.8 FL
POTASSIUM SERPL-SCNC: 4 MMOL/L
RBC # BLD: 4.21 10^6/ΜL
SODIUM BLD-SCNC: 137 MMOL/L
TOTAL IRON BINDING CAPACITY: 380
TOTAL PROTEIN: 7.4 G/DL (ref 6.4–8.2)
WBC # BLD: 5.6 10^3/ML

## 2024-09-24 DIAGNOSIS — R10.13 EPIGASTRIC ABDOMINAL PAIN: ICD-10-CM

## 2024-09-24 DIAGNOSIS — R63.0 LOSS OF APPETITE: ICD-10-CM

## 2024-09-26 RX ORDER — ESOMEPRAZOLE MAGNESIUM 40 MG/1
40 CAPSULE, DELAYED RELEASE ORAL
Qty: 90 CAPSULE | Refills: 0 | Status: SHIPPED | OUTPATIENT
Start: 2024-09-26

## 2024-10-01 ENCOUNTER — HOSPITAL ENCOUNTER (OUTPATIENT)
Age: 29
Discharge: HOME | End: 2024-10-03

## 2024-10-01 PROCEDURE — 88305 TISSUE EXAM BY PATHOLOGIST: CPT

## 2024-10-03 ENCOUNTER — PATIENT MESSAGE (OUTPATIENT)
Dept: FAMILY MEDICINE CLINIC | Age: 29
End: 2024-10-03

## 2024-10-03 DIAGNOSIS — R00.2 PALPITATIONS: Primary | ICD-10-CM

## 2024-10-04 LAB — SURGICAL PATHOLOGY REPORT: NORMAL

## 2024-10-23 ENCOUNTER — HOSPITAL ENCOUNTER (OUTPATIENT)
Dept: SLEEP CENTER | Age: 29
Discharge: HOME | End: 2024-10-23
Payer: COMMERCIAL

## 2024-10-23 DIAGNOSIS — R00.2 PALPITATIONS: ICD-10-CM

## 2024-10-23 PROCEDURE — 93246 EXT ECG>7D<15D RECORDING: CPT

## 2024-12-09 ENCOUNTER — TELEPHONE (OUTPATIENT)
Dept: FAMILY MEDICINE CLINIC | Age: 29
End: 2024-12-09

## 2024-12-09 ENCOUNTER — OFFICE VISIT (OUTPATIENT)
Dept: FAMILY MEDICINE CLINIC | Age: 29
End: 2024-12-09

## 2024-12-09 VITALS
BODY MASS INDEX: 21.97 KG/M2 | DIASTOLIC BLOOD PRESSURE: 58 MMHG | OXYGEN SATURATION: 99 % | HEART RATE: 75 BPM | HEIGHT: 63 IN | SYSTOLIC BLOOD PRESSURE: 100 MMHG | TEMPERATURE: 98.8 F | WEIGHT: 124 LBS

## 2024-12-09 DIAGNOSIS — R00.2 PALPITATIONS: ICD-10-CM

## 2024-12-09 DIAGNOSIS — Z53.21 PATIENT LEFT BEFORE EVALUATION BY PHYSICIAN: Primary | ICD-10-CM

## 2024-12-09 DIAGNOSIS — Z82.49 FAMILY HISTORY OF CORONARY ARTERY DISEASE: Primary | ICD-10-CM

## 2024-12-09 NOTE — TELEPHONE ENCOUNTER
Pt was in for OV today to follow up on chest pain she has been having.  She had to take a phone call pertaining to legal matters and missed her OV.    She asks if doctor would be willing to refer her to cardiology, specifically to Dr. Hinds or if she needs to come back and complete OV.    Please advise.

## 2024-12-19 NOTE — TELEPHONE ENCOUNTER
Referral was placed about 10 days.  No indication that cardiology office has reached out to patient yet.  Please follow-up.

## 2024-12-20 NOTE — TELEPHONE ENCOUNTER
Called Meredith huggins, they advised me to have pt call to schedule.  I sent pt a [employer] message with details on how to do that.  Also left her a VM directing her to the [employer] message for details on how to schedule.

## 2025-01-24 ENCOUNTER — OFFICE VISIT (OUTPATIENT)
Dept: CARDIOLOGY CLINIC | Age: 30
End: 2025-01-24
Payer: COMMERCIAL

## 2025-01-24 VITALS
BODY MASS INDEX: 22.02 KG/M2 | OXYGEN SATURATION: 100 % | HEART RATE: 69 BPM | SYSTOLIC BLOOD PRESSURE: 114 MMHG | RESPIRATION RATE: 18 BRPM | DIASTOLIC BLOOD PRESSURE: 60 MMHG | WEIGHT: 124.3 LBS | TEMPERATURE: 97.7 F | HEIGHT: 63 IN

## 2025-01-24 DIAGNOSIS — I51.9 HEART PROBLEM: Primary | ICD-10-CM

## 2025-01-24 DIAGNOSIS — R00.2 PALPITATIONS: ICD-10-CM

## 2025-01-24 DIAGNOSIS — R07.9 CHEST PAIN, UNSPECIFIED TYPE: ICD-10-CM

## 2025-01-24 PROCEDURE — 93000 ELECTROCARDIOGRAM COMPLETE: CPT | Performed by: INTERNAL MEDICINE

## 2025-01-24 PROCEDURE — 99204 OFFICE O/P NEW MOD 45 MIN: CPT | Performed by: INTERNAL MEDICINE

## 2025-01-24 NOTE — PROGRESS NOTES
Deidre D Magill  1995  Date of Service: 1/24/2025    Reason for Consultation:    We were asked to see Deidre D Magill by Dr. Cuellar, Kd Amezcua MD  regarding palpitations and chest pain.    History of Chief Complaint:   This is a 29 y.o. female with a history of peptic ulcer disease, hiatal hernia, irritable bowel syndrome, psoriasis, goiter, anxiety/depression, and intermittent anemia with her menstrual cycles.  She states that for over 10 years she has had episodes of palpitations that last for less than 30 seconds.  They are typically once every 1 to 2 weeks and occur at rest.  They are more frequent when she has her anemia.  For the past year she has had an occasional episode of chest tightness that occurs at rest and last for less than 1 minute.  It is not associated with her palpitations and is not reproducible with physical activities.  She denies any dyspnea on exertion, orthopnea/PND.  She denies any syncope, or near syncope.    REVIEW OF SYSTEMS:  As above.  See patient questionair for further review of systems.     CURRENT MEDICATIONS:  Current Outpatient Medications   Medication Sig Dispense Refill    progesterone (PROMETRIUM) 200 MG CAPS capsule Take 1 tablet by mouth nightly on CD#14-25. 30 capsule 3    esomeprazole (NEXIUM) 40 MG delayed release capsule Take 1 capsule by mouth every morning (before breakfast) 90 capsule 0    ondansetron (ZOFRAN) 4 MG tablet TAKE ONE TABLET BY MOUTH EVERY 8 HOURS AS NEEDED FOR NAUSEA OR VOMITING 30 tablet 1    Risankizumab-rzaa (SKYRIZI SC) Inject into the skin      CLINDAMYCIN PHOSPHATE,TOPICAL, 1 % SWAB Apply topical to cleanse skin nightly in acne areas. May also use in morning if needed 60 each 3    Multiple Vitamins-Minerals (MULTIVITAMIN ADULTS PO) Take by mouth       No current facility-administered medications for this visit.        ALLERGIES:  Allergies   Allergen Reactions    Iv Dye [Iodides] Other (See Comments)     Shut down kidneys, required

## 2025-01-31 ENCOUNTER — TELEPHONE (OUTPATIENT)
Dept: CARDIOLOGY | Age: 30
End: 2025-01-31

## 2025-01-31 NOTE — TELEPHONE ENCOUNTER
Spoke with patient and confirmed regular stress test appointment on February 4, 2025 at 1130. Instructions for test,, and COVID-19 preprocedure information reviewed with patient, questions answered. Patient verbalized understanding.

## 2025-02-24 DIAGNOSIS — R63.0 LOSS OF APPETITE: ICD-10-CM

## 2025-02-24 DIAGNOSIS — R10.13 EPIGASTRIC ABDOMINAL PAIN: ICD-10-CM

## 2025-02-24 RX ORDER — ESOMEPRAZOLE MAGNESIUM 40 MG/1
40 CAPSULE, DELAYED RELEASE ORAL
Qty: 90 CAPSULE | Refills: 0 | Status: CANCELLED | OUTPATIENT
Start: 2025-02-24

## 2025-02-25 DIAGNOSIS — R10.13 EPIGASTRIC ABDOMINAL PAIN: ICD-10-CM

## 2025-02-25 DIAGNOSIS — R63.0 LOSS OF APPETITE: ICD-10-CM

## 2025-02-25 RX ORDER — ESOMEPRAZOLE MAGNESIUM 40 MG/1
40 CAPSULE, DELAYED RELEASE ORAL
Qty: 90 CAPSULE | Refills: 0 | Status: SHIPPED | OUTPATIENT
Start: 2025-02-25

## 2025-02-25 RX ORDER — CLINDAMYCIN PHOSPHATE 10 MG/ML
SOLUTION TOPICAL
Qty: 60 EACH | Refills: 3 | Status: SHIPPED | OUTPATIENT
Start: 2025-02-25

## 2025-02-25 RX ORDER — LOPERAMIDE HYDROCHLORIDE 2 MG/1
2 CAPSULE ORAL 2 TIMES DAILY PRN
Qty: 60 CAPSULE | Refills: 1 | Status: SHIPPED | OUTPATIENT
Start: 2025-02-25

## 2025-02-25 NOTE — TELEPHONE ENCOUNTER
Last Appointment:  12/9/2024  Future Appointments   Date Time Provider Department Center   2/28/2025  7:15 AM SE[facility] STRESS LAB 1 YZ LUIS ARMANDO [facility]Car   2/28/2025  8:00 AM SEY ANTONY ECHO 2 YZ LUIS ARMANDO [facility] Rad/Car   6/13/2025  8:30 AM Maxwell Mcfarlane, DO ELECTRO PHYS [facility]   1/30/2026 11:00 AM Megan Gabriel, APRN - CNP [facility]      Per ranjan message request:      \"Hello,      I couldn't find a couple medications I've run out of on my refill list, so I wanted to send a message in order to have refills sent when you're able.         Loperamide Anti diarrhea tablets     Nexium capsules (if this is not a medication I should be taking long term, we may need to discuss further options for ulcer treatment)     Clindamycin phosphate 1% topical lotion (the clindamycin in my chart is pledgets, but my insurance will only cover the lotion if you wouldn't mind writing it as such)     Thank you so much for your time it is greatly appreciated!\"

## 2025-02-26 ENCOUNTER — TELEPHONE (OUTPATIENT)
Dept: CARDIOLOGY | Age: 30
End: 2025-02-26

## 2025-02-26 NOTE — TELEPHONE ENCOUNTER
Spoke with patient and confirmed regular stress test appointment on 2/28/25 at 0715. Instructions for test given. Questions answered. Patient verbalized understanding.

## 2025-02-28 ENCOUNTER — TELEPHONE (OUTPATIENT)
Dept: CARDIOLOGY CLINIC | Age: 30
End: 2025-02-28

## 2025-02-28 ENCOUNTER — HOSPITAL ENCOUNTER (OUTPATIENT)
Dept: CARDIOLOGY | Age: 30
End: 2025-02-28
Attending: INTERNAL MEDICINE
Payer: COMMERCIAL

## 2025-02-28 ENCOUNTER — HOSPITAL ENCOUNTER (OUTPATIENT)
Dept: CARDIOLOGY | Age: 30
Discharge: HOME | End: 2025-02-28
Attending: INTERNAL MEDICINE
Payer: COMMERCIAL

## 2025-02-28 VITALS
RESPIRATION RATE: 12 BRPM | HEIGHT: 63 IN | HEART RATE: 74 BPM | WEIGHT: 124 LBS | BODY MASS INDEX: 21.97 KG/M2 | SYSTOLIC BLOOD PRESSURE: 90 MMHG | DIASTOLIC BLOOD PRESSURE: 58 MMHG

## 2025-02-28 VITALS
SYSTOLIC BLOOD PRESSURE: 90 MMHG | BODY MASS INDEX: 21.97 KG/M2 | HEIGHT: 63 IN | DIASTOLIC BLOOD PRESSURE: 58 MMHG | WEIGHT: 124 LBS

## 2025-02-28 DIAGNOSIS — R00.2 PALPITATIONS: ICD-10-CM

## 2025-02-28 DIAGNOSIS — R07.9 CHEST PAIN, UNSPECIFIED TYPE: ICD-10-CM

## 2025-02-28 LAB
ECHO AO ASC DIAM: 2.3 CM
ECHO AO ASCENDING AORTA INDEX: 1.46 CM/M2
ECHO AV AREA PEAK VELOCITY: 2.4 CM2
ECHO AV AREA VTI: 2.4 CM2
ECHO AV AREA/BSA PEAK VELOCITY: 1.5 CM2/M2
ECHO AV AREA/BSA VTI: 1.5 CM2/M2
ECHO AV CUSP MM: 1.7 CM
ECHO AV MEAN GRADIENT: 3 MMHG
ECHO AV MEAN VELOCITY: 0.8 M/S
ECHO AV PEAK GRADIENT: 6 MMHG
ECHO AV PEAK VELOCITY: 1.2 M/S
ECHO AV VELOCITY RATIO: 0.75
ECHO AV VTI: 23.3 CM
ECHO BSA: 1.58 M2
ECHO BSA: 1.58 M2
ECHO EST RA PRESSURE: 3 MMHG
ECHO LA DIAMETER INDEX: 1.96 CM/M2
ECHO LA DIAMETER: 3.1 CM
ECHO LA VOL A-L A2C: 43 ML (ref 22–52)
ECHO LA VOL A-L A4C: 34 ML (ref 22–52)
ECHO LA VOL MOD A2C: 43 ML (ref 22–52)
ECHO LA VOL MOD A4C: 29 ML (ref 22–52)
ECHO LA VOLUME AREA LENGTH: 39 ML
ECHO LA VOLUME INDEX A-L A2C: 27 ML/M2 (ref 16–34)
ECHO LA VOLUME INDEX A-L A4C: 22 ML/M2 (ref 16–34)
ECHO LA VOLUME INDEX AREA LENGTH: 25 ML/M2 (ref 16–34)
ECHO LA VOLUME INDEX MOD A2C: 27 ML/M2 (ref 16–34)
ECHO LA VOLUME INDEX MOD A4C: 18 ML/M2 (ref 16–34)
ECHO LV EF PHYSICIAN: 60 %
ECHO LV FRACTIONAL SHORTENING: 29 % (ref 28–44)
ECHO LV INTERNAL DIMENSION DIASTOLE INDEX: 2.85 CM/M2
ECHO LV INTERNAL DIMENSION DIASTOLIC: 4.5 CM (ref 3.9–5.3)
ECHO LV INTERNAL DIMENSION SYSTOLIC INDEX: 2.03 CM/M2
ECHO LV INTERNAL DIMENSION SYSTOLIC: 3.2 CM
ECHO LV ISOVOLUMETRIC RELAXATION TIME (IVRT): 65.7 MS
ECHO LV IVSD: 0.7 CM (ref 0.6–0.9)
ECHO LV IVSS: 1 CM
ECHO LV MASS 2D: 104.5 G (ref 67–162)
ECHO LV MASS INDEX 2D: 66.1 G/M2 (ref 43–95)
ECHO LV POSTERIOR WALL DIASTOLIC: 0.8 CM (ref 0.6–0.9)
ECHO LV POSTERIOR WALL SYSTOLIC: 1 CM
ECHO LV RELATIVE WALL THICKNESS RATIO: 0.36
ECHO LVOT AREA: 3.1 CM2
ECHO LVOT AV VTI INDEX: 0.8
ECHO LVOT DIAM: 2 CM
ECHO LVOT MEAN GRADIENT: 2 MMHG
ECHO LVOT PEAK GRADIENT: 4 MMHG
ECHO LVOT PEAK VELOCITY: 0.9 M/S
ECHO LVOT STROKE VOLUME INDEX: 37.2 ML/M2
ECHO LVOT SV: 58.7 ML
ECHO LVOT VTI: 18.7 CM
ECHO MV A VELOCITY: 0.9 M/S
ECHO MV AREA PHT: 4.1 CM2
ECHO MV AREA VTI: 3.2 CM2
ECHO MV E DECELERATION TIME (DT): 133 MS
ECHO MV E VELOCITY: 0.82 M/S
ECHO MV E/A RATIO: 0.91
ECHO MV LVOT VTI INDEX: 0.98
ECHO MV MAX VELOCITY: 0.9 M/S
ECHO MV MEAN GRADIENT: 2 MMHG
ECHO MV MEAN VELOCITY: 0.6 M/S
ECHO MV PEAK GRADIENT: 3 MMHG
ECHO MV PRESSURE HALF TIME (PHT): 53.3 MS
ECHO MV VTI: 18.3 CM
ECHO PV MAX VELOCITY: 0.9 M/S
ECHO PV MEAN GRADIENT: 2 MMHG
ECHO PV MEAN VELOCITY: 0.6 M/S
ECHO PV PEAK GRADIENT: 3 MMHG
ECHO PV VTI: 17.1 CM
ECHO PVEIN A DURATION: 79.6 MS
ECHO PVEIN A VELOCITY: 0.2 M/S
ECHO PVEIN PEAK D VELOCITY: 0.4 M/S
ECHO PVEIN PEAK S VELOCITY: 0.4 M/S
ECHO PVEIN S/D RATIO: 1
ECHO RIGHT VENTRICULAR SYSTOLIC PRESSURE (RVSP): 22 MMHG
ECHO RV INTERNAL DIMENSION: 2.9 CM
ECHO TV REGURGITANT MAX VELOCITY: 2.17 M/S
ECHO TV REGURGITANT PEAK GRADIENT: 19 MMHG
STRESS BASELINE DIAS BP: 58 MMHG
STRESS BASELINE HR: 73 BPM
STRESS BASELINE SYS BP: 90 MMHG
STRESS ESTIMATED WORKLOAD: 13.3 METS
STRESS EXERCISE DUR MIN: 10 MIN
STRESS EXERCISE DUR SEC: 1 SEC
STRESS PEAK DIAS BP: 64 MMHG
STRESS PEAK SYS BP: 102 MMHG
STRESS PERCENT HR ACHIEVED: 96 %
STRESS POST PEAK HR: 181 BPM
STRESS RATE PRESSURE PRODUCT: NORMAL BPM*MMHG
STRESS TARGET HR: 189 BPM

## 2025-02-28 PROCEDURE — 93306 TTE W/DOPPLER COMPLETE: CPT | Performed by: INTERNAL MEDICINE

## 2025-02-28 PROCEDURE — 93017 CV STRESS TEST TRACING ONLY: CPT

## 2025-02-28 PROCEDURE — 93016 CV STRESS TEST SUPVJ ONLY: CPT | Performed by: INTERNAL MEDICINE

## 2025-02-28 PROCEDURE — 93018 CV STRESS TEST I&R ONLY: CPT | Performed by: INTERNAL MEDICINE

## 2025-02-28 PROCEDURE — 93306 TTE W/DOPPLER COMPLETE: CPT

## 2025-02-28 NOTE — TELEPHONE ENCOUNTER
----- Message from Dr. Carlos Hinds, DO sent at 2/28/2025  2:23 PM EST -----  Let her know that her stress test was good

## 2025-03-03 NOTE — TELEPHONE ENCOUNTER
Last Appointment:  12/9/2024  Future Appointments   Date Time Provider Department Center   6/13/2025  8:30 AM Maxwell Mcfarlane, DO ELECTRO PHYS [facility]   1/30/2026 11:00 AM Megan Gabriel APRN - CNP [facility]            Recently sent clindamycin swab.  Pt's insurance will only cover lotion.  Pended. Please review Rx.    [place].

## 2025-03-04 ENCOUNTER — TELEPHONE (OUTPATIENT)
Dept: CARDIOLOGY CLINIC | Age: 30
End: 2025-03-04

## 2025-03-04 RX ORDER — CLINDAMYCIN PHOSPHATE 10 UG/ML
LOTION TOPICAL
Qty: 60 ML | Refills: 2 | Status: SHIPPED | OUTPATIENT
Start: 2025-03-04 | End: 2025-04-02

## 2025-03-04 NOTE — TELEPHONE ENCOUNTER
----- Message from Dr. Carlos Hinds, DO sent at 3/3/2025  6:50 PM EST -----  Let her know that the heart function and valves are good.

## 2025-04-06 ENCOUNTER — PATIENT MESSAGE (OUTPATIENT)
Dept: FAMILY MEDICINE CLINIC | Age: 30
End: 2025-04-06

## 2025-04-06 DIAGNOSIS — R63.0 LOSS OF APPETITE: ICD-10-CM

## 2025-04-06 DIAGNOSIS — R10.9 ABDOMINAL PAIN, UNSPECIFIED ABDOMINAL LOCATION: Primary | ICD-10-CM

## 2025-04-07 DIAGNOSIS — R63.0 LOSS OF APPETITE: ICD-10-CM

## 2025-04-07 DIAGNOSIS — R10.13 EPIGASTRIC ABDOMINAL PAIN: ICD-10-CM

## 2025-04-07 RX ORDER — ESOMEPRAZOLE MAGNESIUM 40 MG/1
40 CAPSULE, DELAYED RELEASE ORAL
Qty: 90 CAPSULE | Refills: 1 | Status: SHIPPED | OUTPATIENT
Start: 2025-04-07

## 2025-04-07 NOTE — TELEPHONE ENCOUNTER
Name of Medication(s) Requested:  Requested Prescriptions     Pending Prescriptions Disp Refills    esomeprazole (NEXIUM) 40 MG delayed release capsule 90 capsule 1     Sig: Take 1 capsule by mouth every morning (before breakfast)       Medication is on current medication list Yes    Dosage and directions were verified? Yes    Quantity verified: 90 day supply     Pharmacy Verified?  Yes    Last Appointment:  12/9/2024    Future appts:  Future Appointments   Date Time Provider Department Center   5/6/2025  7:30 AM Maxwell Mcfarlane, DO ELECTRO PHYS [facility]   1/30/2026 11:00 AM Megan Gabriel, APRN - CNP [facility]        (If no appt send self scheduling link. .REFILLAPPT)  Scheduling request sent?     [] Yes  [x] No    Does patient need updated?  [] Yes  [x] No

## 2025-05-05 ENCOUNTER — OFFICE VISIT (OUTPATIENT)
Dept: SURGERY | Age: 30
End: 2025-05-05
Payer: COMMERCIAL

## 2025-05-05 VITALS
HEIGHT: 63 IN | BODY MASS INDEX: 21.62 KG/M2 | WEIGHT: 122 LBS | OXYGEN SATURATION: 98 % | DIASTOLIC BLOOD PRESSURE: 78 MMHG | HEART RATE: 74 BPM | TEMPERATURE: 98.4 F | SYSTOLIC BLOOD PRESSURE: 113 MMHG | RESPIRATION RATE: 18 BRPM

## 2025-05-05 DIAGNOSIS — R11.0 NAUSEA: ICD-10-CM

## 2025-05-05 DIAGNOSIS — R63.0 LOSS OF APPETITE: ICD-10-CM

## 2025-05-05 DIAGNOSIS — K27.9 PUD (PEPTIC ULCER DISEASE): Primary | ICD-10-CM

## 2025-05-05 DIAGNOSIS — R10.13 ABDOMINAL PAIN, EPIGASTRIC: ICD-10-CM

## 2025-05-05 PROCEDURE — 99203 OFFICE O/P NEW LOW 30 MIN: CPT | Performed by: SURGERY

## 2025-05-05 RX ORDER — CLINDAMYCIN PHOSPHATE 10 UG/ML
LOTION TOPICAL 2 TIMES DAILY
COMMUNITY

## 2025-05-05 NOTE — PATIENT INSTRUCTIONS
General Surgery     Preoperative Instructions    Please read the following information very carefully. It contains information that is necessary to best prepare you for your upcoming procedure.    Make arrangements for a  to take you to and from your procedure.  Nothing to eat or drink after midnight the night before your procedure.  Follow your bowel prep instructions if you have them for this procedure.    3 days prior to your procedure: Stop taking blood thinners like Coumadin or Plavix or Xarelto.  5 days prior to your procedure: Stop taking Aspirin or Aspirin containing products.   If you cannot stop any of these medications prior to your procedure, please contact our office.    Medications morning of procedure:  Only heart, breathing, blood pressure, and seizure medications are permitted on the morning of your procedure. These medications can be taken with a sip of water.    IF YOU ARE UNABLE TO KEEP THE ABOVE SCHEDULED PROCEDURE, YOU MUST NOTIFY DR. IMKE'S OFFICE [phone]. NOT THE FACILITY.    NO CHEWING GUM OR CHEWING TOBACCO AFTER MIDNIGHT ON DAY OF PROCEDURE.    YOU MUST HAVE TRANSPORTATION TO AND FROM THE FACILITY.

## 2025-05-05 NOTE — PROGRESS NOTES
History and Physical - General Surgery    Patient's Name/Date of Birth: Deidre D Magill / 1995    Date: 2025    PCP: Kd Cuellar MD    Referring Physician:   Kd Cuellar,*  [phone]      CHIEF COMPLAINT:    Chief Complaint   Patient presents with    Other      Abd pain, Loss of appetite/Dr Cuellar  Pt states she has had ulcers in the past and thinks she might have another one         HISTORY OF PRESENT ILLNESS:    Deidre D Magill is an 30 y.o. female who has a h/o PUD presents with abdominal pain. She has been on Nexium as prescribed by Dr. Cuellar for 4 months. If she takes it she said it helps, but if she misses a dose she has symptoms. She said she has been having epigastric burning and loss of appetite. No diarrhea. She has nausea without vomiting. She takes Zofran as needed. She avoids NSAIDs, EtOH and caffeine. No dysphagia or odynophagia.       Past Medical History:   Past Medical History:   Diagnosis Date    Anemia     Chronic kidney disease     Depression     no medications    GERD (gastroesophageal reflux disease)     Heart abnormality     Hiatal hernia     PONV (postoperative nausea and vomiting)     Thyroid disease     Trauma     Ulcer         Past Surgical History:   Past Surgical History:   Procedure Laterality Date     SECTION  2012     SECTION N/A 2020     SECTION performed by Brian Santana MD at [facility] L&D OR    DILATION AND CURETTAGE OF UTERUS  10/01/2024    [facility] ([facility]    ENDOSCOPY, COLON, DIAGNOSTIC  2015    HYSTEROSCOPY  2018    D&C, polypectomy    INCISE AND DRAIN ABCESS  2012         UPPER GASTROINTESTINAL ENDOSCOPY  2015        Allergies: Iodides, Medrol [methylprednisolone], Nsaids, and Percocet [oxycodone-acetaminophen]     Medications:   Current Outpatient Medications   Medication Sig Dispense Refill    clindamycin (CLEOCIN T) 1 % lotion Apply topically 2 times daily Apply topically 2

## 2025-05-06 ENCOUNTER — OFFICE VISIT (OUTPATIENT)
Dept: NON INVASIVE DIAGNOSTICS | Age: 30
End: 2025-05-06
Payer: COMMERCIAL

## 2025-05-06 VITALS
SYSTOLIC BLOOD PRESSURE: 110 MMHG | RESPIRATION RATE: 16 BRPM | HEART RATE: 65 BPM | DIASTOLIC BLOOD PRESSURE: 62 MMHG | WEIGHT: 122.4 LBS | HEIGHT: 62 IN | TEMPERATURE: 97.5 F | OXYGEN SATURATION: 95 % | BODY MASS INDEX: 22.52 KG/M2

## 2025-05-06 DIAGNOSIS — I49.9 IRREGULAR HEART BEAT: Primary | ICD-10-CM

## 2025-05-06 PROCEDURE — 99203 OFFICE O/P NEW LOW 30 MIN: CPT | Performed by: STUDENT IN AN ORGANIZED HEALTH CARE EDUCATION/TRAINING PROGRAM

## 2025-05-06 PROCEDURE — 93000 ELECTROCARDIOGRAM COMPLETE: CPT | Performed by: STUDENT IN AN ORGANIZED HEALTH CARE EDUCATION/TRAINING PROGRAM

## 2025-05-06 NOTE — PROGRESS NOTES
[facility] CARDIOLOGY  CARDIAC ELECTROPHYSIOLOGY DEPARTMENT/DIVISION OF CARDIOLOGY  Outpatient consultation Report  PATIENT: Deidre D Magill  MEDICAL RECORD NUMBER: [id number]  DATE OF SERVICE:  2025  ATTENDING ELECTROPHYSIOLOGIST:  Maxwell Mcfarlane DO   REFERRING PHYSICIAN: Carlos Hinds DO and Kd Cuellar MD  CHIEF COMPLAINT: Palpitations    HPI: Deidre D Magill  is a 30 y.o. female with a history of hiatal hernia, gastric ulcer, hypothyroidism, CKD, GERD, and depression.  She is managed by Dr. Hinds with PPI. In , she complained of palpitations. She reports a family history of CAD and AF.  An event monitor reported no correlation between symptoms and dysrhythmias.  Exercise stress and TTE were grossly normal.  She presents today, 2025; as referral mouses further evaluation/management of palpitations.  She denies any complaints this time.    Prior cardiac testing:  - TTE (2025): LVEF = 60%, normal.  - Exercise stress ECG (2025): No ischemic ECG changes at 96% of APMHR, METS = 13.3.  - 10-day event monitor (2024): Sinus at  bpm (mean: 75 bpm), patient events during sinus predominantly, SVE burden <1%, VE burden <1%, no AF, SVT, VT, AV block, or significant pauses.    Past Medical History:   Diagnosis Date    Anemia     Chronic kidney disease     Depression     no medications    GERD (gastroesophageal reflux disease)     Heart abnormality     Hiatal hernia     PONV (postoperative nausea and vomiting)     Thyroid disease     Trauma     Ulcer      Past Surgical History:   Procedure Laterality Date     SECTION  2012     SECTION N/A 2020     SECTION performed by Brian Santana MD at [facility] L&D OR    DILATION AND CURETTAGE OF UTERUS  10/01/2024    [facility] ([facility])    ENDOSCOPY, COLON, DIAGNOSTIC  2015    HYSTEROSCOPY  2018    D&C, polypectomy    INCISE AND DRAIN ABCESS  2012         UPPER

## 2025-05-13 ENCOUNTER — PATIENT MESSAGE (OUTPATIENT)
Dept: FAMILY MEDICINE CLINIC | Age: 30
End: 2025-05-13

## 2025-05-13 DIAGNOSIS — R63.0 LOSS OF APPETITE: ICD-10-CM

## 2025-05-13 DIAGNOSIS — R10.13 EPIGASTRIC ABDOMINAL PAIN: ICD-10-CM

## 2025-05-14 RX ORDER — ESOMEPRAZOLE MAGNESIUM 40 MG/1
40 CAPSULE, DELAYED RELEASE ORAL
Qty: 90 CAPSULE | Refills: 1 | OUTPATIENT
Start: 2025-05-14

## 2025-05-15 RX ORDER — CLINDAMYCIN PHOSPHATE 10 UG/ML
LOTION TOPICAL 2 TIMES DAILY
Qty: 60 G | Refills: 1 | Status: SHIPPED | OUTPATIENT
Start: 2025-05-15

## 2025-07-23 NOTE — PROGRESS NOTES
[facility] PRE-ADMISSION TESTING INSTRUCTIONS    Surgery Date 7-28-25  Arrival Time 8:45 a.m.    The Preadmission Testing patient is instructed accordingly using the following criteria (check applicable):    ARRIVAL INSTRUCTIONS:  [x] Parking the day of Surgery is located in the Main Entrance lot.  Upon entering the door, make an immediate right to the surgery reception desk    [x] Bring photo ID and insurance card    [] Bring in a copy of Living will or Durable Power of  papers.    [x] Please be sure to arrange for responsible adult to provide transportation to and from the hospital    [x] Please arrange for responsible adult to be with you for the 24 hour period post procedure due to having anesthesia    [x] If you awake am of surgery not feeling well or have temperature >100 please call [phone]    GENERAL INSTRUCTIONS:    [x] May have up to 8 ounces of water only until 4 hours prior to surgery. NPO time 0600.           No gum, candy or mints.    [x] You may brush your teeth, but do not swallow any water    [x] Take medications as instructed with 1-2 oz of water    [x] Stop herbal supplements and vitamins 5 days prior to procedure    [] Follow preop dosing of blood thinners per physician instructions    [] Take 1/2 dose of evening insulin, but no insulin after midnight    [] No oral diabetic medications after midnight    [] If diabetic and have low blood sugar or feel symptomatic, take 1-2oz apple juice only    [] Bring inhalers day of surgery    [] Bring C-PAP/ Bi-Pap day of surgery    [x] Bring urine specimen day of surgery    [x] Shower or bath with soap, lather and rinse well, AM of Surgery, no lotion, powders or creams to surgical site    [] Follow bowel prep as instructed per surgeon    [x] No tobacco products within 24 hours of surgery     [x] No alcohol or illegal drug use within 24 hours of surgery.    [x] Jewelry, body piercing's, eyeglasses, contact lenses and

## 2025-07-28 ENCOUNTER — HOSPITAL ENCOUNTER (OUTPATIENT)
Age: 30
Setting detail: OUTPATIENT SURGERY
Discharge: HOME | End: 2025-07-28
Attending: SURGERY | Admitting: SURGERY
Payer: COMMERCIAL

## 2025-07-28 ENCOUNTER — ANESTHESIA (OUTPATIENT)
Dept: ENDOSCOPY | Age: 30
End: 2025-07-28
Payer: COMMERCIAL

## 2025-07-28 ENCOUNTER — ANESTHESIA EVENT (OUTPATIENT)
Dept: ENDOSCOPY | Age: 30
End: 2025-07-28
Payer: COMMERCIAL

## 2025-07-28 VITALS
OXYGEN SATURATION: 98 % | HEIGHT: 62 IN | BODY MASS INDEX: 21.16 KG/M2 | RESPIRATION RATE: 20 BRPM | TEMPERATURE: 97.4 F | HEART RATE: 86 BPM | WEIGHT: 115 LBS | SYSTOLIC BLOOD PRESSURE: 105 MMHG | DIASTOLIC BLOOD PRESSURE: 75 MMHG

## 2025-07-28 DIAGNOSIS — R11.0 NAUSEA: ICD-10-CM

## 2025-07-28 DIAGNOSIS — R10.13 ABDOMINAL PAIN, EPIGASTRIC: ICD-10-CM

## 2025-07-28 DIAGNOSIS — R63.0 LOSS OF APPETITE: ICD-10-CM

## 2025-07-28 LAB
HCG, URINE, POC: NEGATIVE
Lab: NORMAL
NEGATIVE QC PASS/FAIL: NORMAL
POSITIVE QC PASS/FAIL: NORMAL

## 2025-07-28 PROCEDURE — 3609012400 HC EGD TRANSORAL BIOPSY SINGLE/MULTIPLE: Performed by: SURGERY

## 2025-07-28 PROCEDURE — 2580000003 HC RX 258: Performed by: NURSE ANESTHETIST, CERTIFIED REGISTERED

## 2025-07-28 PROCEDURE — 2709999900 HC NON-CHARGEABLE SUPPLY: Performed by: SURGERY

## 2025-07-28 PROCEDURE — 3700000001 HC ADD 15 MINUTES (ANESTHESIA): Performed by: SURGERY

## 2025-07-28 PROCEDURE — 7100000011 HC PHASE II RECOVERY - ADDTL 15 MIN: Performed by: SURGERY

## 2025-07-28 PROCEDURE — 7100000010 HC PHASE II RECOVERY - FIRST 15 MIN: Performed by: SURGERY

## 2025-07-28 PROCEDURE — 6360000002 HC RX W HCPCS: Performed by: NURSE ANESTHETIST, CERTIFIED REGISTERED

## 2025-07-28 PROCEDURE — 88342 IMHCHEM/IMCYTCHM 1ST ANTB: CPT

## 2025-07-28 PROCEDURE — 3700000000 HC ANESTHESIA ATTENDED CARE: Performed by: SURGERY

## 2025-07-28 PROCEDURE — 88305 TISSUE EXAM BY PATHOLOGIST: CPT

## 2025-07-28 PROCEDURE — 43239 EGD BIOPSY SINGLE/MULTIPLE: CPT | Performed by: SURGERY

## 2025-07-28 PROCEDURE — 6360000002 HC RX W HCPCS: Performed by: ANESTHESIOLOGY

## 2025-07-28 RX ORDER — PROPOFOL 10 MG/ML
INJECTION, EMULSION INTRAVENOUS
Status: DISCONTINUED | OUTPATIENT
Start: 2025-07-28 | End: 2025-07-28 | Stop reason: SDUPTHER

## 2025-07-28 RX ORDER — SODIUM CHLORIDE 9 MG/ML
INJECTION, SOLUTION INTRAVENOUS
Status: DISCONTINUED | OUTPATIENT
Start: 2025-07-28 | End: 2025-07-28 | Stop reason: SDUPTHER

## 2025-07-28 RX ORDER — ONDANSETRON 2 MG/ML
4 INJECTION INTRAMUSCULAR; INTRAVENOUS ONCE
Status: COMPLETED | OUTPATIENT
Start: 2025-07-28 | End: 2025-07-28

## 2025-07-28 RX ADMIN — PROPOFOL 200 MG: 10 INJECTION, EMULSION INTRAVENOUS at 10:30

## 2025-07-28 RX ADMIN — ONDANSETRON 4 MG: 2 INJECTION, SOLUTION INTRAMUSCULAR; INTRAVENOUS at 09:54

## 2025-07-28 RX ADMIN — SODIUM CHLORIDE: 9 INJECTION, SOLUTION INTRAVENOUS at 10:05

## 2025-07-28 ASSESSMENT — PAIN - FUNCTIONAL ASSESSMENT: PAIN_FUNCTIONAL_ASSESSMENT: 0-10

## 2025-07-28 ASSESSMENT — LIFESTYLE VARIABLES: SMOKING_STATUS: 0

## 2025-07-28 NOTE — TELEPHONE ENCOUNTER
Last Appointment:  12/9/2024  Future Appointments   Date Time Provider Department Center   1/30/2026 11:00 AM Megan Gabriel APRN - CNP [facility]

## 2025-07-28 NOTE — OP NOTE
Operative Note: EGD    Deidre D Magill     DATE OF PROCEDURE: 7/28/2025  SURGEON: Dr. MICHAEL MIKE MD, M.D.     PREOPERATIVE DIAGNOSES:   PUD  Abdominal pain  Heartburn    POSTOPERATIVE DIAGNOSES:  Mild duodenitis  GERD    Small sliding hiatal hernia    OPERATION:    EGD esophagogastroduodenoscopy with antral, distal and proximal duodenal, distal esophageal biopsies    SPECIMENS:  ID Type Source Tests Collected by Time Destination   A : distal duodenal bx Tissue Tissue SURGICAL PATHOLOGY Michael Mike MD 7/28/2025 1036    B : proximal duodenal bx Tissue Tissue SURGICAL PATHOLOGY Michael Mike MD 7/28/2025 1037    C : antral bx Tissue Tissue SURGICAL PATHOLOGY Michael Mike MD 7/28/2025 1037    D : distal esophagus bx Tissue Tissue SURGICAL PATHOLOGY Michael Mike MD 7/28/2025 1037        ANESTHESIA: LMAC    BLOOD LOSS: Minimal    CONSENT AND INDICATIONS:  This is a 30 y.o. year old female who is having the above. I have discussed with the patient and/or the patient representative the indication, alternatives, and the possible risks and/or complications of the planned procedure and the anesthesia methods. The patient and/or patient representative appear to understand and agree to proceed.      PROCEDURE: The patient was placed on the table and sedated by anesthesia. Bite block was placed. A lubricated scope was easily passed into the upper esophagus which looked normal. The distal esophagus looked abnormal: GERD and biopsies were taken. The gastroesophageal junction was at 35 cm. The scope was passed into the stomach and retroflexed. There was a small hiatal hernia. The scope was passed down toward the pylorus. The antral mucosa all looked normal. Biopsy was taken. The scope was then passed through the pylorus into the duodenal bulb which looked abnormal: mild duodenitis and biopsies were taken, then around to the distal duodenum which looked normal and biopsies

## 2025-07-28 NOTE — H&P
Patient's office history and physical was reviewed.    Patient examined.    There has been no change in the patient's history and physical.      Physician Signature: Electronically signed by Dr. Garima Mills    History and Physical - General Surgery    Patient's Name/Date of Birth: Deidre D Magill / 1995    PCP: Kd Cuellar MD    Referring Physician:   No ref. provider found  N/A      CHIEF COMPLAINT:    No chief complaint on file.        HISTORY OF PRESENT ILLNESS:    Deidre D Magill is an 30 y.o. female who has a h/o PUD presents with abdominal pain. She has been on Nexium as prescribed by Dr. Cuellar for 4 months. If she takes it she said it helps, but if she misses a dose she has symptoms. She said she has been having epigastric burning and loss of appetite. No diarrhea. She has nausea without vomiting. She takes Zofran as needed. She avoids NSAIDs, EtOH and caffeine. No dysphagia or odynophagia.       Past Medical History:   Past Medical History:   Diagnosis Date    Anemia     Chronic kidney disease     Depression     no medications    GERD (gastroesophageal reflux disease)     Hiatal hernia     History of palpitations 2025    followed with Pili Hinds & Maeve (negative cardiac work up)    IBS (irritable bowel syndrome)     PONV (postoperative nausea and vomiting)     Psoriasis     h/o    Thyroid disease     goiter (no meds)    Trauma     Ulcer         Past Surgical History:   Past Surgical History:   Procedure Laterality Date     SECTION  2012     SECTION N/A 2020     SECTION performed by Brian Santana MD at [facility] L&D OR    DILATION AND CURETTAGE OF UTERUS  10/01/2024    [facility] ([facility])    ENDOSCOPY, COLON, DIAGNOSTIC  2015    HYSTEROSCOPY  2018    D&C, polypectomy    INCISE AND DRAIN ABCESS  2012         UPPER GASTROINTESTINAL ENDOSCOPY  2015        Allergies: Iodides, Medrol [methylprednisolone], Nsaids, and

## 2025-07-28 NOTE — ANESTHESIA POSTPROCEDURE EVALUATION
Department of Anesthesiology  Postprocedure Note    Patient: Deidre D Magill  MRN: [id number]  YOB: 1995  Date of evaluation: 7/28/2025    Procedure Summary       Date: 07/28/25 Room / Location: [place] [address] / [facility]    Anesthesia Start: 1015 Anesthesia Stop: 1041    Procedure: ESOPHAGOGASTRODUODENOSCOPY BIOPSY Diagnosis:       Nausea      Abdominal pain, epigastric      Loss of appetite      (Nausea [R11.0])      (Abdominal pain, epigastric [R10.13])      (Loss of appetite [R63.0])    Surgeons: Garima Mills MD Responsible Provider: Alli Ascencio MD    Anesthesia Type: MAC ASA Status: 2            Anesthesia Type: No value filed.    Hussain Phase I:      Hussain Phase II:      Anesthesia Post Evaluation    Patient location during evaluation: PACU  Patient participation: complete - patient participated  Level of consciousness: awake and alert  Pain score: 0  Airway patency: patent  Nausea & Vomiting: no nausea and no vomiting  Cardiovascular status: blood pressure returned to baseline  Respiratory status: acceptable  Hydration status: euvolemic  Pain management: adequate and satisfactory to patient        No notable events documented.

## 2025-07-28 NOTE — ANESTHESIA PRE PROCEDURE
Department of Anesthesiology  Preprocedure Note       Name:  Deidre D Magill   Age:  30 y.o.  :  1995                                          MRN:  [id number]         Date:  2025      Surgeon: Surgeon(s):  Garima Mills MD    Procedure: Procedure(s):  ESOPHAGOGASTRODUODENOSCOPY    Medications prior to admission:   Prior to Admission medications    Medication Sig Start Date End Date Taking? Authorizing Provider   Probiotic Product (PROBIOTIC PO) Take by mouth daily   Yes Soraya Thompson MD   esomeprazole (NEXIUM) 40 MG delayed release capsule Take 1 capsule by mouth every morning (before breakfast) 25  Yes Kd Cuellar MD   Multiple Vitamins-Minerals (MULTIVITAMIN ADULTS PO) Take by mouth   Yes Soraya Thompson MD   clindamycin (CLEOCIN T) 1 % lotion Apply topically 2 times daily 5/15/25   Kd Cuellar MD   loperamide (RA ANTI-DIARRHEAL) 2 MG capsule Take 1 capsule by mouth 2 times daily as needed for Diarrhea 25   Kd Cuellar MD   ondansetron (ZOFRAN) 4 MG tablet TAKE ONE TABLET BY MOUTH EVERY 8 HOURS AS NEEDED FOR NAUSEA OR VOMITING 24   Kd Cuellar MD   Risankizumab-rzaa (SKYRIZI SC) Inject into the skin    ProviderSoraya MD       Current medications:    No current facility-administered medications for this encounter.       Allergies:    Allergies   Allergen Reactions   • Iodides Other (See Comments)   • Medrol [Methylprednisolone]      IV caused tingling to arms legs face   • Nsaids Other (See Comments)   • Percocet [Oxycodone-Acetaminophen] Nausea Only and Other (See Comments)     Jaundice, fever, chills and hallucinations       Problem List:    Patient Active Problem List   Diagnosis Code   • Maternal varicella, non-immune O09.899, Z28.39   • Previous  delivery affecting pregnancy- Progressed to 8 cm. emergent PLTCS for Fetal bradycardia. History of abscess of C/S incision. C/S 20 at 0930 if not

## 2025-07-28 NOTE — DISCHARGE INSTRUCTIONS
[facility] EGD PROCEDURE DISCHARGE INSTRUCTIONS  You may be drowsy or lightheaded after receiving sedation or anesthesia.    A responsible person should be with you for the next 24 hours.    Please follow the instructions checked below:    DIET INSTRUCTIONS:  [x]Start with light diet and progress to your normal diet as you feel like eating. If you experience nausea or repeated episodes of vomiting which persist beyond 12-24 hours, notify your doctor.  []Other     ACTIVITY INSTRUCTIONS:  [x]Rest today. Increase activity as tolerated    []No heavy lifting or strenuous activity     [x]No driving for today  []Other      MEDICATION INSTRUCTIONS:    []Prescriptions sent with you.  Use as directed.  When taking pain medications, you may experience dizziness or drowsiness.  Do not drink alcohol or drive when taking these medications.  [x]Continue preop medications                               Post-procedure Care   If any tissue was removed:   It will be sent to a lab to be examined. It may take 1-2 weeks for results. The doctor will usually give an initial report after the scope is removed. Other tests may be recommended.   A small amount of bleeding may occur during the first few days after the procedure.     When you return home after the procedure, be sure to follow your doctor's instructions, which may include:   Resume medicines as instructed by your doctor.   Resume normal diet, unless directed otherwise by your doctor.   The sedative will make you drowsy. Avoid driving, operating machinery, or making important decisions for the rest of the day.   Rest for the remainder of the day.   Rest when you get home.   Avoid alcohol for the rest of the day.   Be sure to follow your doctor's instructions .     Results   This test gives your doctor information about the health of your digestive system. The results can help to explain your symptoms. You and your doctor will talk about the results and your

## 2025-07-29 ENCOUNTER — TELEPHONE (OUTPATIENT)
Dept: SURGERY | Age: 30
End: 2025-07-29

## 2025-07-29 RX ORDER — ONDANSETRON 4 MG/1
TABLET, FILM COATED ORAL
Qty: 30 TABLET | Refills: 0 | Status: SHIPPED | OUTPATIENT
Start: 2025-07-29

## 2025-08-04 LAB — SURGICAL PATHOLOGY REPORT: NORMAL

## 2025-08-18 ENCOUNTER — OFFICE VISIT (OUTPATIENT)
Dept: SURGERY | Age: 30
End: 2025-08-18
Payer: COMMERCIAL

## 2025-08-18 VITALS
HEIGHT: 62 IN | HEART RATE: 71 BPM | OXYGEN SATURATION: 99 % | DIASTOLIC BLOOD PRESSURE: 65 MMHG | WEIGHT: 119.6 LBS | BODY MASS INDEX: 22.01 KG/M2 | RESPIRATION RATE: 16 BRPM | TEMPERATURE: 97.2 F | SYSTOLIC BLOOD PRESSURE: 97 MMHG

## 2025-08-18 DIAGNOSIS — K27.9 PUD (PEPTIC ULCER DISEASE): Primary | ICD-10-CM

## 2025-08-18 PROCEDURE — 99214 OFFICE O/P EST MOD 30 MIN: CPT | Performed by: SURGERY

## 2025-08-18 RX ORDER — FAMOTIDINE 20 MG/1
20 TABLET, FILM COATED ORAL 2 TIMES DAILY
Qty: 28 TABLET | Refills: 0 | Status: SHIPPED | OUTPATIENT
Start: 2025-08-18 | End: 2025-09-01

## 2025-08-18 RX ORDER — RISANKIZUMAB-RZAA 150 MG/ML
INJECTION SUBCUTANEOUS
COMMUNITY
Start: 2025-08-14

## 2025-08-19 DIAGNOSIS — M35.9 AUTOIMMUNE DISEASE: Primary | ICD-10-CM

## 2025-08-20 ENCOUNTER — TELEPHONE (OUTPATIENT)
Dept: RHEUMATOLOGY | Age: 30
End: 2025-08-20

## 2025-09-02 DIAGNOSIS — K27.9 PUD (PEPTIC ULCER DISEASE): Primary | ICD-10-CM

## 2025-09-02 RX ORDER — FAMOTIDINE 20 MG/1
20 TABLET, FILM COATED ORAL 2 TIMES DAILY
Qty: 28 TABLET | Refills: 0 | Status: CANCELLED | OUTPATIENT
Start: 2025-09-02 | End: 2025-09-16

## 2025-09-02 RX ORDER — FAMOTIDINE 20 MG/1
20 TABLET, FILM COATED ORAL 2 TIMES DAILY
Qty: 180 TABLET | Refills: 1 | OUTPATIENT
Start: 2025-09-02

## 2025-09-04 RX ORDER — FAMOTIDINE 20 MG/1
TABLET, FILM COATED ORAL
Qty: 28 TABLET | Refills: 0 | OUTPATIENT
Start: 2025-09-04

## 2025-09-05 RX ORDER — FAMOTIDINE 20 MG/1
20 TABLET, FILM COATED ORAL 2 TIMES DAILY
Qty: 180 TABLET | Refills: 1 | Status: SHIPPED | OUTPATIENT
Start: 2025-09-05

## (undated) DEVICE — MEDI-VAC YANKAUER SUCTION HANDLE W/BULBOUS TIP: Brand: CARDINAL HEALTH

## (undated) DEVICE — CATHETERIZATION KIT FOL16 FR 2000 CC DRAINAGE BG LUBRICATH

## (undated) DEVICE — APPLICATOR PREP 26ML 0.7% IOD POVACRYLEX 74% ISO ALC ST

## (undated) DEVICE — SUTURE VCRL + SZ 3-0 L36IN ABSRB UD L36MM CT-1 1/2 CIR VCP944H

## (undated) DEVICE — COVER,LIGHT HANDLE,FLX,2/PK: Brand: MEDLINE INDUSTRIES, INC.

## (undated) DEVICE — SUTURE VCRL + SZ 3-0 L27IN ABSRB UD L26MM SH 1/2 CIR VCP416H

## (undated) DEVICE — GRADUATE TRIANG MEASURE 1000ML BLK PRNT

## (undated) DEVICE — FORCEPS BX OVL CUP FEN DISPOSABLE CAP L 160CM RAD JAW 4

## (undated) DEVICE — CONTAINER SPEC 64OZ POLYPR PATH SNAP LOK CAP W/ LID

## (undated) DEVICE — TUBE BLD COLLECT ST 1 SIL COAT 7ML 10ML

## (undated) DEVICE — BLADE SURG NO20 S STL STR DISP GLASSVAN

## (undated) DEVICE — 3000CC GUARDIAN II: Brand: GUARDIAN

## (undated) DEVICE — BLOCK BITE 60FR RUBBER ADLT DENTAL

## (undated) DEVICE — SPONGE LAP W18XL18IN WHT COT 4 PLY FLD STRUNG RADPQ DISP ST

## (undated) DEVICE — SUTURE PLN GUT SZ 3-0 L27IN ABSRB YELLOWISH TAN L36MM CT-1 842H

## (undated) DEVICE — TOWEL,OR,DSP,ST,BLUE,STD,6/PK,12PK/CS: Brand: MEDLINE

## (undated) DEVICE — SUTURE STRATAFIX SYMMETRIC SZ 1 L18IN ABSRB VLT CT1 L36CM SXPP1A404

## (undated) DEVICE — GLOVE SURG SZ 75 L12IN FNGR THK83MIL CRM POLYISOPRENE

## (undated) DEVICE — SUTURE CHROMIC GUT SZ 2-0 L36IN ABSRB BRN L36MM CT-1 1/2 923H

## (undated) DEVICE — TUBING, SUCTION, 3/16" X 12', STRAIGHT: Brand: MEDLINE

## (undated) DEVICE — GOWN,SIRUS,FABRNF,L,20/CS: Brand: MEDLINE

## (undated) DEVICE — HYPODERMIC SAFETY NEEDLE: Brand: MAGELLAN

## (undated) DEVICE — GOWN,SIRUS,POLYRNF,BRTHSLV,XLN/XL,20/CS: Brand: MEDLINE

## (undated) DEVICE — PEN: MARKING STD 100/CS: Brand: MEDICAL ACTION INDUSTRIES

## (undated) DEVICE — SHEET,DRAPE,53X77,STERILE: Brand: MEDLINE

## (undated) DEVICE — PENCIL ES L3M BTTN SWCH HOLSTER W/ BLDE ELECTRD EDGE

## (undated) DEVICE — CESAREAN BIRTH PACK II: Brand: MEDLINE INDUSTRIES, INC.

## (undated) DEVICE — SUTURE CHROMIC GUT SZ 1 L36IN ABSRB BRN L40MM CT 1/2 CIR 915H

## (undated) DEVICE — Device: Brand: PORTEX

## (undated) DEVICE — ELECTRODE PT RET AD L9FT HI MOIST COND ADH HYDRGEL CORDED

## (undated) DEVICE — GAUZE,SPONGE,4"X4",4PLY,STRL,LF: Brand: MEDLINE

## (undated) DEVICE — COUNTER NDL 30 COUNT DBL MAG

## (undated) DEVICE — GLOVE SURG SZ 65 L12IN FNGR THK83MIL CRM POLYISOPRENE

## (undated) DEVICE — CONTAINER,SPEC,PNEUM TUBE,3OZ,STRL PATH: Brand: MEDLINE